# Patient Record
Sex: MALE | Race: WHITE | NOT HISPANIC OR LATINO | URBAN - METROPOLITAN AREA
[De-identification: names, ages, dates, MRNs, and addresses within clinical notes are randomized per-mention and may not be internally consistent; named-entity substitution may affect disease eponyms.]

---

## 2019-04-05 ENCOUNTER — OUTPATIENT (OUTPATIENT)
Dept: OUTPATIENT SERVICES | Facility: HOSPITAL | Age: 69
LOS: 1 days | Discharge: HOME | End: 2019-04-05

## 2019-04-05 DIAGNOSIS — N40.0 BENIGN PROSTATIC HYPERPLASIA WITHOUT LOWER URINARY TRACT SYMPTOMS: ICD-10-CM

## 2019-04-05 DIAGNOSIS — E11.9 TYPE 2 DIABETES MELLITUS WITHOUT COMPLICATIONS: ICD-10-CM

## 2019-04-05 DIAGNOSIS — E53.8 DEFICIENCY OF OTHER SPECIFIED B GROUP VITAMINS: ICD-10-CM

## 2019-04-05 DIAGNOSIS — E78.5 HYPERLIPIDEMIA, UNSPECIFIED: ICD-10-CM

## 2019-04-05 DIAGNOSIS — E55.9 VITAMIN D DEFICIENCY, UNSPECIFIED: ICD-10-CM

## 2019-04-05 DIAGNOSIS — I10 ESSENTIAL (PRIMARY) HYPERTENSION: ICD-10-CM

## 2019-04-05 DIAGNOSIS — D50.8 OTHER IRON DEFICIENCY ANEMIAS: ICD-10-CM

## 2019-04-05 DIAGNOSIS — E03.9 HYPOTHYROIDISM, UNSPECIFIED: ICD-10-CM

## 2019-09-20 ENCOUNTER — APPOINTMENT (OUTPATIENT)
Dept: CARDIOLOGY | Facility: CLINIC | Age: 69
End: 2019-09-20
Payer: MEDICARE

## 2019-09-20 PROCEDURE — 99214 OFFICE O/P EST MOD 30 MIN: CPT | Mod: 25

## 2019-09-20 PROCEDURE — 93000 ELECTROCARDIOGRAM COMPLETE: CPT

## 2019-10-15 ENCOUNTER — APPOINTMENT (OUTPATIENT)
Dept: CARDIOLOGY | Facility: CLINIC | Age: 69
End: 2019-10-15
Payer: MEDICARE

## 2019-10-15 PROCEDURE — 93880 EXTRACRANIAL BILAT STUDY: CPT

## 2020-01-10 ENCOUNTER — APPOINTMENT (OUTPATIENT)
Dept: CARDIOLOGY | Facility: CLINIC | Age: 70
End: 2020-01-10
Payer: MEDICARE

## 2020-01-10 PROCEDURE — 93000 ELECTROCARDIOGRAM COMPLETE: CPT

## 2020-01-10 PROCEDURE — 99214 OFFICE O/P EST MOD 30 MIN: CPT

## 2020-05-19 ENCOUNTER — APPOINTMENT (OUTPATIENT)
Dept: CARDIOLOGY | Facility: CLINIC | Age: 70
End: 2020-05-19
Payer: MEDICARE

## 2020-05-19 PROCEDURE — 99213 OFFICE O/P EST LOW 20 MIN: CPT | Mod: 95

## 2020-07-24 ENCOUNTER — APPOINTMENT (OUTPATIENT)
Dept: CARDIOLOGY | Facility: CLINIC | Age: 70
End: 2020-07-24
Payer: MEDICARE

## 2020-07-24 PROCEDURE — 93000 ELECTROCARDIOGRAM COMPLETE: CPT

## 2020-07-24 PROCEDURE — 99213 OFFICE O/P EST LOW 20 MIN: CPT

## 2020-09-03 ENCOUNTER — RX RENEWAL (OUTPATIENT)
Age: 70
End: 2020-09-03

## 2020-10-04 ENCOUNTER — RX RENEWAL (OUTPATIENT)
Age: 70
End: 2020-10-04

## 2021-02-24 ENCOUNTER — RX RENEWAL (OUTPATIENT)
Age: 71
End: 2021-02-24

## 2021-03-29 ENCOUNTER — RX RENEWAL (OUTPATIENT)
Age: 71
End: 2021-03-29

## 2021-04-09 ENCOUNTER — APPOINTMENT (OUTPATIENT)
Dept: CARDIOLOGY | Facility: CLINIC | Age: 71
End: 2021-04-09
Payer: MEDICARE

## 2021-04-09 VITALS
DIASTOLIC BLOOD PRESSURE: 75 MMHG | HEIGHT: 66 IN | BODY MASS INDEX: 35.36 KG/M2 | HEART RATE: 54 BPM | SYSTOLIC BLOOD PRESSURE: 120 MMHG | WEIGHT: 220 LBS

## 2021-04-09 DIAGNOSIS — Z78.9 OTHER SPECIFIED HEALTH STATUS: ICD-10-CM

## 2021-04-09 DIAGNOSIS — Z87.891 PERSONAL HISTORY OF NICOTINE DEPENDENCE: ICD-10-CM

## 2021-04-09 PROCEDURE — 93000 ELECTROCARDIOGRAM COMPLETE: CPT

## 2021-04-09 PROCEDURE — 99213 OFFICE O/P EST LOW 20 MIN: CPT

## 2021-04-09 PROCEDURE — 99072 ADDL SUPL MATRL&STAF TM PHE: CPT

## 2021-04-09 NOTE — HISTORY OF PRESENT ILLNESS
[FreeTextEntry1] : 70-yo male with h/o CAD, s/p CABG, HTN, hyperlipidemia.\par \par Inferolateral ischemia in 2016. LHC showed patent grafts, 80% stenosis of proximal LCX (at the take-off of bypassed OM1), stented successfully with BRIDGET ([employer]).\par \par S/p gastric sleeve surgery, patient lost weight initially, gained again.\par \par No CP, SOB now. BP has been stable.

## 2021-04-09 NOTE — ASSESSMENT
[FreeTextEntry1] : CAD,  s/p CABG and BRIDGET LCX. No angina.\par HTN.\par Hyperlipidemia.\par Obesity.\par \par Plan:\par Diet, weight loss discussed again.\par Increase daily walking.\par BW from PMD.\par F/u in 6 months.\par \par Nelson Lancaster MD\par

## 2021-04-09 NOTE — PHYSICAL EXAM
[General Appearance - Well Developed] : well developed [Normal Appearance] : normal appearance [Well Groomed] : well groomed [General Appearance - Well Nourished] : well nourished [No Deformities] : no deformities [General Appearance - In No Acute Distress] : no acute distress [Normal Conjunctiva] : the conjunctiva exhibited no abnormalities [Respiration, Rhythm And Depth] : normal respiratory rhythm and effort [Exaggerated Use Of Accessory Muscles For Inspiration] : no accessory muscle use [Auscultation Breath Sounds / Voice Sounds] : lungs were clear to auscultation bilaterally [Heart Rate And Rhythm] : heart rate and rhythm were normal [Heart Sounds] : normal S1 and S2 [Murmurs] : no murmurs present [Arterial Pulses Normal] : the arterial pulses were normal [Edema] : no peripheral edema present [Bowel Sounds] : normal bowel sounds [Abdomen Soft] : soft [Abdomen Tenderness] : non-tender [Skin Color & Pigmentation] : normal skin color and pigmentation [] : no rash [Oriented To Time, Place, And Person] : oriented to person, place, and time

## 2021-08-24 ENCOUNTER — RX RENEWAL (OUTPATIENT)
Age: 71
End: 2021-08-24

## 2021-09-29 ENCOUNTER — RX RENEWAL (OUTPATIENT)
Age: 71
End: 2021-09-29

## 2021-10-21 ENCOUNTER — APPOINTMENT (OUTPATIENT)
Dept: CARDIOLOGY | Facility: CLINIC | Age: 71
End: 2021-10-21
Payer: MEDICARE

## 2021-10-21 VITALS
DIASTOLIC BLOOD PRESSURE: 70 MMHG | TEMPERATURE: 97.8 F | HEIGHT: 66 IN | HEART RATE: 53 BPM | BODY MASS INDEX: 36.32 KG/M2 | SYSTOLIC BLOOD PRESSURE: 120 MMHG | WEIGHT: 226 LBS

## 2021-10-21 PROCEDURE — 99213 OFFICE O/P EST LOW 20 MIN: CPT

## 2021-10-21 PROCEDURE — 93000 ELECTROCARDIOGRAM COMPLETE: CPT

## 2021-10-21 NOTE — HISTORY OF PRESENT ILLNESS
[FreeTextEntry1] : 71-yo male with h/o CAD, s/p CABG, HTN, hyperlipidemia.\par \par Inferolateral ischemia in 2016. LHC showed patent grafts, 80% stenosis of proximal LCX (at the take-off of bypassed OM1), stented successfully with BRIDGET ([employer]).\par \par S/p gastric sleeve surgery, patient lost weight initially, gained again.\par \par No CP, SOB now. BP has been stable.

## 2022-02-15 ENCOUNTER — RX RENEWAL (OUTPATIENT)
Age: 72
End: 2022-02-15

## 2022-03-22 ENCOUNTER — RX RENEWAL (OUTPATIENT)
Age: 72
End: 2022-03-22

## 2022-04-28 ENCOUNTER — RESULT CHARGE (OUTPATIENT)
Age: 72
End: 2022-04-28

## 2022-04-28 ENCOUNTER — APPOINTMENT (OUTPATIENT)
Dept: CARDIOLOGY | Facility: CLINIC | Age: 72
End: 2022-04-28
Payer: MEDICARE

## 2022-04-28 VITALS
DIASTOLIC BLOOD PRESSURE: 76 MMHG | HEART RATE: 61 BPM | BODY MASS INDEX: 34.55 KG/M2 | WEIGHT: 215 LBS | HEIGHT: 66 IN | SYSTOLIC BLOOD PRESSURE: 138 MMHG

## 2022-04-28 PROCEDURE — 93000 ELECTROCARDIOGRAM COMPLETE: CPT

## 2022-04-28 PROCEDURE — 99213 OFFICE O/P EST LOW 20 MIN: CPT

## 2022-04-28 NOTE — HISTORY OF PRESENT ILLNESS
[FreeTextEntry1] : 71-yo male with h/o CAD, s/p CABG, HTN, hyperlipidemia.\par \par Inferolateral ischemia in 2016. LHC showed patent grafts, 80% stenosis of proximal LCX (at the take-off of bypassed OM1), stented successfully with BRIDGET ([employer]).\par \par S/p gastric sleeve surgery. Patient has lost 20 pounds since starting Ozempic.\par \par No CP, SOB now. BP has been stable.\par \par LDL 92.

## 2022-04-28 NOTE — ASSESSMENT
[FreeTextEntry1] : 72 y/o CAD,  s/p CABG and BRIDGET LCX. No angina.\par HTN.\par Hyperlipidemia.\par Obesity.\par \par \par Plan:\par Diet, weight loss discussed again.\par Increase daily walking.\par Increase Atorvastatin to 40 mg daily.\par Repeat fasting blood work.\par F/u in 6 months.\par \par Nelson Lancaster MD\par

## 2022-04-28 NOTE — PHYSICAL EXAM
[Well Developed] : well developed [No Acute Distress] : no acute distress [Normal Conjunctiva] : normal conjunctiva [Normal Venous Pressure] : normal venous pressure [No Carotid Bruit] : no carotid bruit [Normal S1, S2] : normal S1, S2 [No Murmur] : no murmur [No Rub] : no rub [S4] : S4 [Clear Lung Fields] : clear lung fields [Good Air Entry] : good air entry [No Respiratory Distress] : no respiratory distress  [Soft] : abdomen soft [Non Tender] : non-tender [Normal Bowel Sounds] : normal bowel sounds [Normal Gait] : normal gait [No Edema] : no edema [No Cyanosis] : no cyanosis [No Clubbing] : no clubbing [No Varicosities] : no varicosities [No Rash] : no rash [Moves all extremities] : moves all extremities [No Focal Deficits] : no focal deficits [Normal Speech] : normal speech [Alert and Oriented] : alert and oriented [Normal memory] : normal memory [Obese] : obese

## 2022-08-08 ENCOUNTER — RX RENEWAL (OUTPATIENT)
Age: 72
End: 2022-08-08

## 2022-08-27 ENCOUNTER — RX RENEWAL (OUTPATIENT)
Age: 72
End: 2022-08-27

## 2022-10-27 ENCOUNTER — APPOINTMENT (OUTPATIENT)
Dept: CARDIOLOGY | Facility: CLINIC | Age: 72
End: 2022-10-27

## 2022-10-27 VITALS
RESPIRATION RATE: 14 BRPM | BODY MASS INDEX: 34.55 KG/M2 | HEART RATE: 56 BPM | WEIGHT: 215 LBS | TEMPERATURE: 97.4 F | DIASTOLIC BLOOD PRESSURE: 84 MMHG | HEIGHT: 66 IN | SYSTOLIC BLOOD PRESSURE: 124 MMHG

## 2022-10-27 PROCEDURE — 99213 OFFICE O/P EST LOW 20 MIN: CPT

## 2022-10-27 PROCEDURE — 93000 ELECTROCARDIOGRAM COMPLETE: CPT

## 2022-10-27 RX ORDER — CYANOCOBALAMIN 1000 UG/ML
1000 INJECTION INTRAMUSCULAR; SUBCUTANEOUS
Qty: 3 | Refills: 0 | Status: ACTIVE | COMMUNITY
Start: 2022-07-14

## 2022-10-27 RX ORDER — MOMETASONE FUROATE 1 MG/G
0.1 OINTMENT TOPICAL
Qty: 45 | Refills: 0 | Status: DISCONTINUED | COMMUNITY
Start: 2022-09-03

## 2022-10-27 NOTE — ASSESSMENT
[FreeTextEntry1] : 73 y/o CAD,  s/p CABG and BRIDGET LCX. No angina.\par HTN.\par Hyperlipidemia.\par Obesity.\par \par \par Plan:\par Diet, weight loss discussed again.\par Increase daily walking.\par Continue treatment.\par Repeat fasting blood work.\par F/u in 6 months.\par \par Nelson Lancaster MD\par

## 2022-10-27 NOTE — HISTORY OF PRESENT ILLNESS
[FreeTextEntry1] : 72-yo male with h/o CAD, s/p CABG, HTN, hyperlipidemia.\par \par Inferolateral ischemia in 2016. LHC showed patent grafts, 80% stenosis of proximal LCX (at the take-off of bypassed OM1), stented successfully with BRIDGET ([employer]).\par \par S/p gastric sleeve surgery. Patient has lost 20 pounds since starting Ozempic.\par \par No CP, SOB now. BP has been stable.\par \par LDL 80.

## 2022-10-30 ENCOUNTER — RX RENEWAL (OUTPATIENT)
Age: 72
End: 2022-10-30

## 2023-02-03 ENCOUNTER — RX RENEWAL (OUTPATIENT)
Age: 73
End: 2023-02-03

## 2023-03-06 ENCOUNTER — RX RENEWAL (OUTPATIENT)
Age: 73
End: 2023-03-06

## 2023-04-26 ENCOUNTER — RX RENEWAL (OUTPATIENT)
Age: 73
End: 2023-04-26

## 2023-04-27 ENCOUNTER — APPOINTMENT (OUTPATIENT)
Dept: CARDIOLOGY | Facility: CLINIC | Age: 73
End: 2023-04-27
Payer: MEDICARE

## 2023-04-27 ENCOUNTER — RESULT CHARGE (OUTPATIENT)
Age: 73
End: 2023-04-27

## 2023-04-27 VITALS
WEIGHT: 210 LBS | SYSTOLIC BLOOD PRESSURE: 124 MMHG | HEART RATE: 59 BPM | BODY MASS INDEX: 33.75 KG/M2 | DIASTOLIC BLOOD PRESSURE: 80 MMHG | HEIGHT: 66 IN

## 2023-04-27 DIAGNOSIS — R07.89 OTHER CHEST PAIN: ICD-10-CM

## 2023-04-27 DIAGNOSIS — Z00.00 ENCOUNTER FOR GENERAL ADULT MEDICAL EXAMINATION W/OUT ABNORMAL FINDINGS: ICD-10-CM

## 2023-04-27 DIAGNOSIS — Z95.1 PRESENCE OF AORTOCORONARY BYPASS GRAFT: ICD-10-CM

## 2023-04-27 PROCEDURE — 93000 ELECTROCARDIOGRAM COMPLETE: CPT

## 2023-04-27 PROCEDURE — 99214 OFFICE O/P EST MOD 30 MIN: CPT

## 2023-04-27 NOTE — ASSESSMENT
[FreeTextEntry1] : 71 y/o CAD,  s/p CABG and BRIDGET LCX.\par HTN controlled \par Hyperlipidemia.\par Obesity.\par Chest discomfort\par OCHOA\par Dizziness \par \par Plan:\par Diet, weight loss discussed again.\par Continue treatment.\par Repeat fasting blood work.\par 2D ECHO.\par Exercise MPI.\par Carotid duplex.\par F/u after the tests.\par \par Nelson Lancaster MD\par

## 2023-04-27 NOTE — HISTORY OF PRESENT ILLNESS
[FreeTextEntry1] : 72-yo male with h/o CAD, s/p CABG, HTN, hyperlipidemia.\par \par Inferolateral ischemia in 2016. LHC showed patent grafts, 80% stenosis of proximal LCX (at the take-off of bypassed OM1), stented successfully with BRIDGET ([employer]).\par \par S/p gastric sleeve surgery. Patient has lost 20 pounds since starting Ozempic.\par \par Pt presents for a follow up visit. He is c/o episodes of dizziness after he eats, no syncope. He is also reporting new onset of OCHOA when he walks, used to be able to walk his dog with no symptoms, now he has to stop and rest/sit in the middle of his walk. He is also c/o occasional episodes of left-sided squeezing chest discomfort, with or without exertion. \par \par BP at home normal. \par \par GFR 95.\par \par

## 2023-04-27 NOTE — REVIEW OF SYSTEMS
[Negative] : Neurological [Dyspnea on exertion] : dyspnea during exertion [Chest Discomfort] : chest discomfort [Rash] : no rash [Anxiety] : no anxiety [Easy Bleeding] : no tendency for easy bleeding [Easy Bruising] : no tendency for easy bruising

## 2023-04-28 LAB
ALBUMIN SERPL ELPH-MCNC: 4.8 G/DL
ALP BLD-CCNC: 95 U/L
ALT SERPL-CCNC: 32 U/L
ANION GAP SERPL CALC-SCNC: 16 MMOL/L
AST SERPL-CCNC: 19 U/L
BASOPHILS # BLD AUTO: 0.04 K/UL
BASOPHILS NFR BLD AUTO: 0.5 %
BILIRUB SERPL-MCNC: 0.8 MG/DL
BUN SERPL-MCNC: 22 MG/DL
CALCIUM SERPL-MCNC: 10.1 MG/DL
CHLORIDE SERPL-SCNC: 98 MMOL/L
CHOLEST SERPL-MCNC: 163 MG/DL
CO2 SERPL-SCNC: 25 MMOL/L
CREAT SERPL-MCNC: 0.9 MG/DL
EGFR: 92 ML/MIN/1.73M2
EOSINOPHIL # BLD AUTO: 0.22 K/UL
EOSINOPHIL NFR BLD AUTO: 2.6 %
ESTIMATED AVERAGE GLUCOSE: 108 MG/DL
GLUCOSE SERPL-MCNC: 80 MG/DL
HBA1C MFR BLD HPLC: 5.4 %
HCT VFR BLD CALC: 47.6 %
HDLC SERPL-MCNC: 51 MG/DL
HGB BLD-MCNC: 15.1 G/DL
IMM GRANULOCYTES NFR BLD AUTO: 0.4 %
LDLC SERPL CALC-MCNC: 90 MG/DL
LYMPHOCYTES # BLD AUTO: 2.46 K/UL
LYMPHOCYTES NFR BLD AUTO: 28.7 %
MAN DIFF?: NORMAL
MCHC RBC-ENTMCNC: 29.7 PG
MCHC RBC-ENTMCNC: 31.7 G/DL
MCV RBC AUTO: 93.7 FL
MONOCYTES # BLD AUTO: 0.93 K/UL
MONOCYTES NFR BLD AUTO: 10.9 %
NEUTROPHILS # BLD AUTO: 4.89 K/UL
NEUTROPHILS NFR BLD AUTO: 56.9 %
NONHDLC SERPL-MCNC: 112 MG/DL
PLATELET # BLD AUTO: 260 K/UL
POTASSIUM SERPL-SCNC: 4.5 MMOL/L
PROT SERPL-MCNC: 7.6 G/DL
RBC # BLD: 5.08 M/UL
RBC # FLD: 14.4 %
SODIUM SERPL-SCNC: 139 MMOL/L
TRIGL SERPL-MCNC: 109 MG/DL
TSH SERPL-ACNC: 2.49 UIU/ML
WBC # FLD AUTO: 8.57 K/UL

## 2023-05-11 ENCOUNTER — APPOINTMENT (OUTPATIENT)
Dept: CARDIOLOGY | Facility: CLINIC | Age: 73
End: 2023-05-11
Payer: MEDICARE

## 2023-05-11 PROCEDURE — 93306 TTE W/DOPPLER COMPLETE: CPT

## 2023-05-16 ENCOUNTER — OUTPATIENT (OUTPATIENT)
Dept: OUTPATIENT SERVICES | Facility: HOSPITAL | Age: 73
LOS: 1 days | End: 2023-05-16
Payer: MEDICARE

## 2023-05-16 ENCOUNTER — RESULT REVIEW (OUTPATIENT)
Age: 73
End: 2023-05-16

## 2023-05-16 DIAGNOSIS — R07.89 OTHER CHEST PAIN: ICD-10-CM

## 2023-05-16 DIAGNOSIS — Z00.8 ENCOUNTER FOR OTHER GENERAL EXAMINATION: ICD-10-CM

## 2023-05-16 DIAGNOSIS — R06.02 SHORTNESS OF BREATH: ICD-10-CM

## 2023-05-16 PROCEDURE — 78452 HT MUSCLE IMAGE SPECT MULT: CPT

## 2023-05-16 PROCEDURE — 93018 CV STRESS TEST I&R ONLY: CPT

## 2023-05-16 PROCEDURE — A9500: CPT

## 2023-05-16 PROCEDURE — 78452 HT MUSCLE IMAGE SPECT MULT: CPT | Mod: 26

## 2023-05-17 DIAGNOSIS — R06.02 SHORTNESS OF BREATH: ICD-10-CM

## 2023-05-17 DIAGNOSIS — R07.89 OTHER CHEST PAIN: ICD-10-CM

## 2023-06-02 ENCOUNTER — APPOINTMENT (OUTPATIENT)
Dept: CARDIOLOGY | Facility: CLINIC | Age: 73
End: 2023-06-02
Payer: MEDICARE

## 2023-06-02 PROCEDURE — 93880 EXTRACRANIAL BILAT STUDY: CPT

## 2023-06-22 ENCOUNTER — APPOINTMENT (OUTPATIENT)
Dept: CARDIOLOGY | Facility: CLINIC | Age: 73
End: 2023-06-22
Payer: MEDICARE

## 2023-06-22 ENCOUNTER — RESULT CHARGE (OUTPATIENT)
Age: 73
End: 2023-06-22

## 2023-06-22 VITALS
WEIGHT: 207 LBS | BODY MASS INDEX: 33.27 KG/M2 | SYSTOLIC BLOOD PRESSURE: 118 MMHG | DIASTOLIC BLOOD PRESSURE: 85 MMHG | HEIGHT: 66 IN | HEART RATE: 62 BPM

## 2023-06-22 DIAGNOSIS — R06.02 SHORTNESS OF BREATH: ICD-10-CM

## 2023-06-22 DIAGNOSIS — I25.9 CHRONIC ISCHEMIC HEART DISEASE, UNSPECIFIED: ICD-10-CM

## 2023-06-22 PROCEDURE — 93000 ELECTROCARDIOGRAM COMPLETE: CPT

## 2023-06-22 PROCEDURE — 99214 OFFICE O/P EST MOD 30 MIN: CPT

## 2023-06-22 NOTE — REVIEW OF SYSTEMS
[Dyspnea on exertion] : dyspnea during exertion [Chest Discomfort] : chest discomfort [Negative] : Neurological [Rash] : no rash [Anxiety] : no anxiety [Easy Bleeding] : no tendency for easy bleeding [Easy Bruising] : no tendency for easy bruising

## 2023-06-22 NOTE — ASSESSMENT
[FreeTextEntry1] : 74 y/o CAD,  s/p CABG and BRIDGET LCX.\par HTN controlled \par Hyperlipidemia not well controlled \par Obesity.\par Episodes of chest pain and increased OCHOA. New inferior wall motion abnormalities and RV dysfunction, evidence of inferior wall ischemia.\par \par Plan:\par Diet, weight loss discussed again.\par Increase Atorvastatin to 80 mg daily.\par Will schedule LHC at [facility].\par F/u after the cath.\par \par Nelson Lancaster MD\par

## 2023-06-23 LAB
ANION GAP SERPL CALC-SCNC: 13 MMOL/L
BUN SERPL-MCNC: 29 MG/DL
CALCIUM SERPL-MCNC: 9.3 MG/DL
CHLORIDE SERPL-SCNC: 103 MMOL/L
CO2 SERPL-SCNC: 26 MMOL/L
CREAT SERPL-MCNC: 0.9 MG/DL
EGFR: 91 ML/MIN/1.73M2
GLUCOSE SERPL-MCNC: 103 MG/DL
INR PPP: 1 RATIO
POTASSIUM SERPL-SCNC: 3.6 MMOL/L
PT BLD: 11.4 SEC
SODIUM SERPL-SCNC: 142 MMOL/L

## 2023-07-05 ENCOUNTER — OUTPATIENT (OUTPATIENT)
Dept: INPATIENT UNIT | Facility: HOSPITAL | Age: 73
LOS: 1 days | Discharge: HOME | End: 2023-07-05
Payer: MEDICARE

## 2023-07-05 VITALS
SYSTOLIC BLOOD PRESSURE: 155 MMHG | RESPIRATION RATE: 18 BRPM | DIASTOLIC BLOOD PRESSURE: 68 MMHG | OXYGEN SATURATION: 99 % | HEART RATE: 61 BPM

## 2023-07-05 DIAGNOSIS — I25.119 ATHEROSCLEROTIC HEART DISEASE OF NATIVE CORONARY ARTERY WITH UNSPECIFIED ANGINA PECTORIS: ICD-10-CM

## 2023-07-05 DIAGNOSIS — Z95.1 PRESENCE OF AORTOCORONARY BYPASS GRAFT: Chronic | ICD-10-CM

## 2023-07-05 DIAGNOSIS — R07.9 CHEST PAIN, UNSPECIFIED: ICD-10-CM

## 2023-07-05 DIAGNOSIS — Z90.3 ACQUIRED ABSENCE OF STOMACH [PART OF]: Chronic | ICD-10-CM

## 2023-07-05 LAB
ANION GAP SERPL CALC-SCNC: 11 MMOL/L — SIGNIFICANT CHANGE UP (ref 7–14)
BUN SERPL-MCNC: 23 MG/DL — HIGH (ref 10–20)
CALCIUM SERPL-MCNC: 9.3 MG/DL — SIGNIFICANT CHANGE UP (ref 8.4–10.4)
CHLORIDE SERPL-SCNC: 102 MMOL/L — SIGNIFICANT CHANGE UP (ref 98–110)
CO2 SERPL-SCNC: 28 MMOL/L — SIGNIFICANT CHANGE UP (ref 17–32)
CREAT SERPL-MCNC: 1 MG/DL — SIGNIFICANT CHANGE UP (ref 0.7–1.5)
EGFR: 80 ML/MIN/1.73M2 — SIGNIFICANT CHANGE UP
GLUCOSE SERPL-MCNC: 96 MG/DL — SIGNIFICANT CHANGE UP (ref 70–99)
HCT VFR BLD CALC: 40.6 % — LOW (ref 42–52)
HCT VFR BLD CALC: 42.8 % — SIGNIFICANT CHANGE UP (ref 42–52)
HGB BLD-MCNC: 13.9 G/DL — LOW (ref 14–18)
HGB BLD-MCNC: 14.1 G/DL — SIGNIFICANT CHANGE UP (ref 14–18)
MCHC RBC-ENTMCNC: 29.9 PG — SIGNIFICANT CHANGE UP (ref 27–31)
MCHC RBC-ENTMCNC: 30.4 PG — SIGNIFICANT CHANGE UP (ref 27–31)
MCHC RBC-ENTMCNC: 32.9 G/DL — SIGNIFICANT CHANGE UP (ref 32–37)
MCHC RBC-ENTMCNC: 34.2 G/DL — SIGNIFICANT CHANGE UP (ref 32–37)
MCV RBC AUTO: 88.8 FL — SIGNIFICANT CHANGE UP (ref 80–94)
MCV RBC AUTO: 90.9 FL — SIGNIFICANT CHANGE UP (ref 80–94)
NRBC # BLD: 0 /100 WBCS — SIGNIFICANT CHANGE UP (ref 0–0)
NRBC # BLD: 0 /100 WBCS — SIGNIFICANT CHANGE UP (ref 0–0)
PLATELET # BLD AUTO: 217 K/UL — SIGNIFICANT CHANGE UP (ref 130–400)
PLATELET # BLD AUTO: 241 K/UL — SIGNIFICANT CHANGE UP (ref 130–400)
PMV BLD: 10.1 FL — SIGNIFICANT CHANGE UP (ref 7.4–10.4)
PMV BLD: 10.2 FL — SIGNIFICANT CHANGE UP (ref 7.4–10.4)
POTASSIUM SERPL-MCNC: 4 MMOL/L — SIGNIFICANT CHANGE UP (ref 3.5–5)
POTASSIUM SERPL-SCNC: 4 MMOL/L — SIGNIFICANT CHANGE UP (ref 3.5–5)
RBC # BLD: 4.57 M/UL — LOW (ref 4.7–6.1)
RBC # BLD: 4.71 M/UL — SIGNIFICANT CHANGE UP (ref 4.7–6.1)
RBC # FLD: 14 % — SIGNIFICANT CHANGE UP (ref 11.5–14.5)
RBC # FLD: 14.3 % — SIGNIFICANT CHANGE UP (ref 11.5–14.5)
SODIUM SERPL-SCNC: 141 MMOL/L — SIGNIFICANT CHANGE UP (ref 135–146)
WBC # BLD: 7.95 K/UL — SIGNIFICANT CHANGE UP (ref 4.8–10.8)
WBC # BLD: 9.56 K/UL — SIGNIFICANT CHANGE UP (ref 4.8–10.8)
WBC # FLD AUTO: 7.95 K/UL — SIGNIFICANT CHANGE UP (ref 4.8–10.8)
WBC # FLD AUTO: 9.56 K/UL — SIGNIFICANT CHANGE UP (ref 4.8–10.8)

## 2023-07-05 PROCEDURE — 80048 BASIC METABOLIC PNL TOTAL CA: CPT

## 2023-07-05 PROCEDURE — C1725: CPT

## 2023-07-05 PROCEDURE — C1874: CPT

## 2023-07-05 PROCEDURE — 93455 CORONARY ART/GRFT ANGIO S&I: CPT | Mod: 59

## 2023-07-05 PROCEDURE — C1894: CPT

## 2023-07-05 PROCEDURE — 92928 PRQ TCAT PLMT NTRAC ST 1 LES: CPT | Mod: LC

## 2023-07-05 PROCEDURE — 93005 ELECTROCARDIOGRAM TRACING: CPT

## 2023-07-05 PROCEDURE — 36415 COLL VENOUS BLD VENIPUNCTURE: CPT

## 2023-07-05 PROCEDURE — 93458 L HRT ARTERY/VENTRICLE ANGIO: CPT | Mod: 26

## 2023-07-05 PROCEDURE — C1769: CPT

## 2023-07-05 PROCEDURE — C1887: CPT

## 2023-07-05 PROCEDURE — 93010 ELECTROCARDIOGRAM REPORT: CPT

## 2023-07-05 PROCEDURE — 85027 COMPLETE CBC AUTOMATED: CPT

## 2023-07-05 RX ORDER — ATORVASTATIN CALCIUM 80 MG/1
1 TABLET, FILM COATED ORAL
Refills: 0 | DISCHARGE

## 2023-07-05 RX ORDER — METOPROLOL TARTRATE 50 MG
1 TABLET ORAL
Refills: 0 | DISCHARGE

## 2023-07-05 RX ORDER — ASPIRIN/CALCIUM CARB/MAGNESIUM 324 MG
1 TABLET ORAL
Qty: 30 | Refills: 3
Start: 2023-07-05 | End: 2023-11-01

## 2023-07-05 RX ORDER — AMLODIPINE BESYLATE 2.5 MG/1
1 TABLET ORAL
Refills: 0 | DISCHARGE

## 2023-07-05 RX ORDER — ASPIRIN/CALCIUM CARB/MAGNESIUM 324 MG
1 TABLET ORAL
Refills: 0 | DISCHARGE

## 2023-07-05 RX ORDER — PANTOPRAZOLE SODIUM 20 MG/1
1 TABLET, DELAYED RELEASE ORAL
Qty: 30 | Refills: 0
Start: 2023-07-05 | End: 2023-08-03

## 2023-07-05 RX ORDER — TELMISARTAN AND HYDROCHLOROTHIAZIDE 40; 12.5 MG/1; MG/1
1 TABLET ORAL
Refills: 0 | DISCHARGE

## 2023-07-05 RX ORDER — TICAGRELOR 90 MG/1
1 TABLET ORAL
Qty: 60 | Refills: 3
Start: 2023-07-05 | End: 2023-11-01

## 2023-07-05 NOTE — ASU PATIENT PROFILE, ADULT - FALL HARM RISK - UNIVERSAL INTERVENTIONS
Bed in lowest position, wheels locked, appropriate side rails in place/Call bell, personal items and telephone in reach/Instruct patient to call for assistance before getting out of bed or chair/Non-slip footwear when patient is out of bed/[place] to call system/Physically safe environment - no spills, clutter or unnecessary equipment/Purposeful Proactive Rounding/Room/bathroom lighting operational, light cord in reach

## 2023-07-05 NOTE — H&P CARDIOLOGY - NSICDXPASTMEDICALHX_GEN_ALL_CORE_FT
PAST MEDICAL HISTORY:  CAD (coronary artery disease)     CAD S/P percutaneous coronary angioplasty     HTN (hypertension)     Hyperlipidemia

## 2023-07-05 NOTE — PROGRESS NOTE ADULT - SUBJECTIVE AND OBJECTIVE BOX
Cardiology Follow up s/p PCI BRIDGET to mCx x 1    JOSH HUGHES   73y Male    PAST MEDICAL & SURGICAL HISTORY:  CAD (coronary artery disease)  Hyperlipidemia  HTN (hypertension)  CAD S/P percutaneous coronary angioplasty  S/P CABG (coronary artery bypass graft)  H/O gastric sleeve    HPI:  Patient is a 73y Male who has a PMH of CAD s/p CABG in [facility] over 10years ago per patient (LIMA-LAD,SVG to OM1, SVG to RPDA), s/p PCI 8/10/2016 to LCx, HTN, HLD,  Sx history: CABG, gastric sleeve  Family history: none    Patient has been having symptoms of CP and OCHOA with fatigue after walking his dog and gets better with rest. Patient had + Stress test 23 and presents to the cardiology department for LHC with possible intervention.     < from: NM Nuclear Stress Pharmacologic Multiple (23 @ 15:08) >    Impression:    1.   There is a small sized perfusion defect involving the basal inferior   and basal inferolateral walls of the left ventricle. On the resting   images, this defect is reversible suggesting ischemia.  2.   Stress EKG was nondiagnostic due to baseline ST and T-wave changes.  3.  Left ventricular ejection fraction of  > 55  %.  4.  This is a low  risk positive result.        Pre cath note:  indication:  [ ] STEMI                [ ] NSTEMI                 [ ] Acute coronary syndrome                   [ ]Unstable Angina   [ ] high risk  [ ] intermediate risk  [ ] low risk                   [x ] Stable Angina     non-invasive testing:    stress test                      Date:   23                  result: [ ] high risk  [ x] intermediate risk  [ ] low risk    Anti- Anginal medications:                    [ ] not used                       [x ] used                   [ ] not used but strong indication not to use  -amlodipine 10mg   -Metoprolol Succinate 25mg    Ejection Fraction                   [ ] <29            [ ] 30-39%   [ ] 40-49%     [x ]>50%    CHF                   [ ] active (within last 14 days on meds   [ ] Chronic (on meds but no exacerbation)    COPD                   [ ] mild (on chronic bronchodilators)  [ ] moderate (on chronic steroid therapy)      [ ] severe (indication for home O2 or PACO2 >50)    Other risk factors:                     [ ] Previous MI                     [ ] CVA/ stroke                    [ ] carotid stent/ CEA                    [ ] PVD/PAD- (arterial aneurysm, non-palpable pulses, tortuous vessel with inability to insert catheter, infra-renal dissection, renal or subclavian artery stenosis)                    [ ] diabetic                    [x ] previous CABG                    [ ] Renal Failure     Bleeding Risk: 0.7%      Left RADIAL ARTERY EVALUATION:  CONRAD TEST: [] Negative          [] Positive  BARBEAU TEST: [] Class A           [] Class B           [] Class C            [x] Class D        EF: 57%    EK23 SR     Fluids: 250cc NS bolus x 1 for pre cath     Patient did not take ASA since 7/3  -ASA 81mg x 2 PO now for pre cath  (2023 07:13)    Allergies: No Known Allergies    Intolerances      Patient seen and examined at bedside.  Patient without complaints.  Denies CP, SOB, palpitations, or dizziness  No events on telemetry     Vital Signs Last 24 Hrs  T(C): --  T(F): --  HR: 54)  BP: 158/81  BP(mean):   RR: 18  SpO2: 99% room air      MEDICATIONS  (STANDING):    MEDICATIONS  (PRN):      REVIEW OF SYSTEMS:          All negative except as mentioned in HPI    PHYSICAL EXAM:           CONSTITUTIONAL: Well-developed; well-nourished; in no acute distress  	SKIN: warm, dry  	HEAD: Normocephalic; atraumatic  	EYES: PERRL.  	ENT: No nasal discharge, airway clear, mucous membranes moist  	NECK: Supple; non tender.  	CARD: +S1, +S2, no murmurs, gallops, or rubs. Regular rate and rhythm    	RESP: No wheezes, rales or rhonchi. CTA B/L  	ABD: soft ntnd, + BS x 4 quadrants  	EXT: moves all extremities,  no clubbing, cyanosis or edema  	NEURO: Alert and oriented x3, no focal deficits          PSYCH: Cooperative, appropriate          VASCULAR:  + Rad / + PTs / +  DPs          EXTREMITY:             Right Groin:  Dressing D/C/I, Sheath currently in place, access site soft, no hematoma, no pain, + pulses, no sign of infection, no numbness  	   Leftt Radial: Dressing D/C/I, TR band in place, access site soft, no hematoma, no pain, + pulses, no sign of infection, no numbness            ECG: pending at 1600  labs pending at 1600                                                                                                                  LABS:                        14.1   7.95  )-----------( 241      ( 2023 07:29 )             42.8     07-05    141  |  102  |  23<H>  ----------------------------<  96  4.0   |  28  |  1.0    Ca    9.3      2023 07:29      A/P:  I discussed the case with Cardiologist Dr. Lancaster and recommend the following:    S/P PCI:     Intervention:  SYNERGY XD 2.75X32 distal circumflex (AUC 7)     FINDINGS:   The coronary circulation is co-dominant.    Left main: The vessel was large sized. Distal left main: There was a 40 % stenosis.     LAD: The vessel was medium sized.   Proximal LAD: Mild atherosclerosis with no flow limiting lesions.   Mid LAD: Competitive flow noted. Fills from patient LIMA-LAD graft   Distal LAD; The artery was supplied by a patent bypass graft.   1st diagonal: Moderate atherosclerosis.    Circumflex: Medium sized (co-dominant)  Proximal circumflex: Mild disease.   Distal Circumflex: 80% lesion. Collaterals to RCA.  Ist obtuse marginal: Supplied by a patent bypass graft    RCA: Medium to large sized.   Proximal RCA: 60% atherosclerosis at the distal end.   Mid RCA: Moderate atherosclerotic disease  Distal RCA: Supplied by collaterals from left system  RPDA: Small sized vessel     Graft to the distal LAD: Patent    Graft to the 1st obtuse marginal: Patent    Graft to the RPDA: 100% occluded graft                        CBC/ECG at 1600                   NS  100cc/hr x _6hr  	     Continue DAPT ( Aspirin 81 mg PO Daily and Brilinta 90mg twice daily ),  Telmisartan/HCTZ, B-Blocker, Statin Therapy, Norvasc, PPI                   Patient pharmacy called in for Brilinta prescription and it is  $37.00 per month, patient agreeable with monthly price, available for pickup today                  Patient to take pm dose Brilinta 90mg before discharge tonight from cath lab                    Patient agreeing to take DAPT for at least one year or as directed by cardiologist                    Pt given instructions on importance of taking antiplatelet medication or risk acute stent thrombosis/death                   Post cath instructions, access site care and activity restrictions reviewed with patient                     Discussed with patient to return to hospital if experience chest pain, shortness breath, dizziness and site bleeding                   Aggressive risk factor modification, diet counseling, smoking cessation discussed with patient                       Can discharge patient from cardiac standpoint at 2000 after ambulating without symptoms and access site wnl, ECG and blood work reviewed                    Benefits of Cardiac Rehab discussed with patient, All [facility]. Patient instructed to call and make first                               appointment after first f/u visit with Cardiologist                    Follow up with Cardiology Dr. Lancaster  in  two weeks.  Instructed to call and make an appointment                      Discharge instructions as follows, when ready to d/c:                   - Continue medical regimen as prescribed to prevent chest pain                   - Continue dual anti-platelet therapy, beta blocker, statin, PPI, telmisartan/HCTZ, norvasc                   - If you are diabetic and taking medication containing Metformin, do not take them for 48 hours after the procedure                   - Instructed to call 911 if chest pain, shortness of breath or bleeding from access site                   - No heavy lifting >10lbs x 1 week                   - No driving x 24 hours                   - No baths, swimming pools x 1 week, may shower                   - Low sodium low fat low cholesterol diet                   - Follow-up with Cardiologist in 2 weeks after discharge                                        Cardiology Follow up s/p PCI BRIDGET to dCx x 1    JOSH HUGHES   73y Male    PAST MEDICAL & SURGICAL HISTORY:  CAD (coronary artery disease)  Hyperlipidemia  HTN (hypertension)  CAD S/P percutaneous coronary angioplasty  S/P CABG (coronary artery bypass graft)  H/O gastric sleeve    HPI:  Patient is a 73y Male who has a PMH of CAD s/p CABG in [facility] over 10years ago per patient (LIMA-LAD,SVG to OM1, SVG to RPDA), s/p PCI 8/10/2016 to LCx, HTN, HLD,  Sx history: CABG, gastric sleeve  Family history: none    Patient has been having symptoms of CP and OCHOA with fatigue after walking his dog and gets better with rest. Patient had + Stress test 23 and presents to the cardiology department for LHC with possible intervention.     < from: NM Nuclear Stress Pharmacologic Multiple (23 @ 15:08) >    Impression:    1.   There is a small sized perfusion defect involving the basal inferior   and basal inferolateral walls of the left ventricle. On the resting   images, this defect is reversible suggesting ischemia.  2.   Stress EKG was nondiagnostic due to baseline ST and T-wave changes.  3.  Left ventricular ejection fraction of  > 55  %.  4.  This is a low  risk positive result.        Pre cath note:  indication:  [ ] STEMI                [ ] NSTEMI                 [ ] Acute coronary syndrome                   [ ]Unstable Angina   [ ] high risk  [ ] intermediate risk  [ ] low risk                   [x ] Stable Angina     non-invasive testing:    stress test                      Date:   23                  result: [ ] high risk  [ x] intermediate risk  [ ] low risk    Anti- Anginal medications:                    [ ] not used                       [x ] used                   [ ] not used but strong indication not to use  -amlodipine 10mg   -Metoprolol Succinate 25mg    Ejection Fraction                   [ ] <29            [ ] 30-39%   [ ] 40-49%     [x ]>50%    CHF                   [ ] active (within last 14 days on meds   [ ] Chronic (on meds but no exacerbation)    COPD                   [ ] mild (on chronic bronchodilators)  [ ] moderate (on chronic steroid therapy)      [ ] severe (indication for home O2 or PACO2 >50)    Other risk factors:                     [ ] Previous MI                     [ ] CVA/ stroke                    [ ] carotid stent/ CEA                    [ ] PVD/PAD- (arterial aneurysm, non-palpable pulses, tortuous vessel with inability to insert catheter, infra-renal dissection, renal or subclavian artery stenosis)                    [ ] diabetic                    [x ] previous CABG                    [ ] Renal Failure     Bleeding Risk: 0.7%      Left RADIAL ARTERY EVALUATION:  CONRAD TEST: [] Negative          [] Positive  BARBEAU TEST: [] Class A           [] Class B           [] Class C            [x] Class D        EF: 57%    EK23 SR     Fluids: 250cc NS bolus x 1 for pre cath     Patient did not take ASA since 7/3  -ASA 81mg x 2 PO now for pre cath  (2023 07:13)    Allergies: No Known Allergies    Intolerances      Patient seen and examined at bedside.  Patient without complaints.  Denies CP, SOB, palpitations, or dizziness  No events on telemetry     Vital Signs Last 24 Hrs  T(C): --  T(F): --  HR: 54)  BP: 158/81  BP(mean):   RR: 18  SpO2: 99% room air      MEDICATIONS  (STANDING):    MEDICATIONS  (PRN):      REVIEW OF SYSTEMS:          All negative except as mentioned in HPI    PHYSICAL EXAM:           CONSTITUTIONAL: Well-developed; well-nourished; in no acute distress  	SKIN: warm, dry  	HEAD: Normocephalic; atraumatic  	EYES: PERRL.  	ENT: No nasal discharge, airway clear, mucous membranes moist  	NECK: Supple; non tender.  	CARD: +S1, +S2, no murmurs, gallops, or rubs. Regular rate and rhythm    	RESP: No wheezes, rales or rhonchi. CTA B/L  	ABD: soft ntnd, + BS x 4 quadrants  	EXT: moves all extremities,  no clubbing, cyanosis or edema  	NEURO: Alert and oriented x3, no focal deficits          PSYCH: Cooperative, appropriate          VASCULAR:  + Rad / + PTs / +  DPs          EXTREMITY:             Right Groin:  Dressing D/C/I, Sheath currently in place, access site soft, no hematoma, no pain, + pulses, no sign of infection, no numbness  	   Leftt Radial: Dressing D/C/I, TR band in place, access site soft, no hematoma, no pain, + pulses, no sign of infection, no numbness            ECG: pending at 1600  labs pending at 1600                                                                                                                  LABS:                        14.1   7.95  )-----------( 241      ( 2023 07:29 )             42.8     07-05    141  |  102  |  23<H>  ----------------------------<  96  4.0   |  28  |  1.0    Ca    9.3      2023 07:29      A/P:  I discussed the case with Cardiologist Dr. Lancaster and recommend the following:    S/P PCI:     Intervention:  SYNERGY XD 2.75X32 distal circumflex (AUC 7)     FINDINGS:   The coronary circulation is co-dominant.    Left main: The vessel was large sized. Distal left main: There was a 40 % stenosis.     LAD: The vessel was medium sized.   Proximal LAD: Mild atherosclerosis with no flow limiting lesions.   Mid LAD: Competitive flow noted. Fills from patient LIMA-LAD graft   Distal LAD; The artery was supplied by a patent bypass graft.   1st diagonal: Moderate atherosclerosis.    Circumflex: Medium sized (co-dominant)  Proximal circumflex: Mild disease.   Distal Circumflex: 80% lesion. Collaterals to RCA.  Ist obtuse marginal: Supplied by a patent bypass graft    RCA: Medium to large sized.   Proximal RCA: 60% atherosclerosis at the distal end.   Mid RCA: Moderate atherosclerotic disease  Distal RCA: Supplied by collaterals from left system  RPDA: Small sized vessel     Graft to the distal LAD: Patent    Graft to the 1st obtuse marginal: Patent    Graft to the RPDA: 100% occluded graft                        CBC/ECG at 1600                   NS  100cc/hr x _6hr  	     Continue DAPT ( Aspirin 81 mg PO Daily and Brilinta 90mg twice daily ),  Telmisartan/HCTZ, B-Blocker, Statin Therapy, Norvasc, PPI                   Patient pharmacy called in for Brilinta prescription and it is  $37.00 per month, patient agreeable with monthly price, available for pickup today                  Patient to take pm dose Brilinta 90mg before discharge tonight from cath lab                    Patient agreeing to take DAPT for at least one year or as directed by cardiologist                    Pt given instructions on importance of taking antiplatelet medication or risk acute stent thrombosis/death                   Post cath instructions, access site care and activity restrictions reviewed with patient                     Discussed with patient to return to hospital if experience chest pain, shortness breath, dizziness and site bleeding                   Aggressive risk factor modification, diet counseling, smoking cessation discussed with patient                       Can discharge patient from cardiac standpoint at 2000 after ambulating without symptoms and access site wnl, ECG and blood work reviewed                    Benefits of Cardiac Rehab discussed with patient, All documents sent to [facility]. Patient instructed to call and make first                               appointment after first f/u visit with Cardiologist                    Follow up with Cardiology Dr. Lancaster  in  two weeks.  Instructed to call and make an appointment                      Discharge instructions as follows, when ready to d/c:                   - Continue medical regimen as prescribed to prevent chest pain                   - Continue dual anti-platelet therapy, beta blocker, statin, PPI, telmisartan/HCTZ, norvasc                   - If you are diabetic and taking medication containing Metformin, do not take them for 48 hours after the procedure                   - Instructed to call 911 if chest pain, shortness of breath or bleeding from access site                   - No heavy lifting >10lbs x 1 week                   - No driving x 24 hours                   - No baths, swimming pools x 1 week, may shower                   - Low sodium low fat low cholesterol diet                   - Follow-up with Cardiologist in 2 weeks after discharge

## 2023-07-05 NOTE — CHART NOTE - NSCHARTNOTEFT_GEN_A_CORE
PROCEDURE:   [x ] LHC   [ ] LVG   [ ] RHC   [x ] Intervention (see below)       PHYSICIAN:  Dr. Cazares and Dr. Lancaster  FELLOW: Dr. Manzano    PROCEDURE DESCRIPTION:     Consent:    [x] Patient   [] Family Member   []  Used      Anesthesia:   [ ] General   [X] Sedation   [X] Local     Access & Closure:   [ X]  6 Fr    L    Radial Artery  -> TR Band   [ X]  6 Fr R      Femoral Artery -> Sutured in place      IV Contrast: 120 mL      Intervention:  SYNERGY XD 2.75X32 distal circumflex (AUC 7)     FINDINGS:   The coronary circulation is co-dominant. There was significant 3-vessel coronary artery disease (LAD. RCA, and circumflex). Bypass grafts were patent.     Left main: The vessel was large sized.   Distal left main: There was a 30 % stenosis. LAD: The vessel was medium sized. Proximal LAD: Angiography showed mild atherosclerosis with no flow limiting lesions. Mid LAD: There was a 100 % stenosis just after D1. Distal LAD; The artery was supplied by a patent bypass graft. 1st diagonal: The vessel was small sized. Angiography showed moderate atherosclerosis with no clinical lesions appreciated.     Circumflex: The vessel was medium sized (co-dominant). There were two major obtuse marginals: Proximal circumflex: The vessel was medium sized. There was a 80 % stenosis at the origin of OM1. Ist obtuse marginal: The vessel was medium  sized. The artery was supplied by a patent bypass graft. There was a 100 % stenosis in the proximal third of the vessel segment. 1st left posterolateral: The vessel was small sized.     RCA: The vessel was medium to large sized. Proximal RCA: Angiography showed moderate atherosclerosis with no clinical lesions appreciated. Mid RCA: There was a discrete 100 % stenosis. Dista] RCA: The artery was supplied by a patent bypass graft. Graft to the LAD: The graft was a medium sized LIMA from the aorta. Graft angiography showed no evidence of disease and no degeneration. Graft to the 1st obtuse marginal: The graft was a medium sized aphenous vein graft from  the aorta. Graft angiography showed no evidence of disease. Graft to the RPDA: The graft was a large sized saphenous vein graft from the aorta. Graft angiography showed no evidence of disease.    ESTIMATED BLOOD LOSS: < 10 mL      CONDITION:   [x] Good   [ ] Fair   [ ] Critical     SPECIMEN REMOVED: N/A     POST-OP DIAGNOSIS:    [ ] Normal Coronary Angiogram   [ ] Mild Coronary Artery Disease (< 50% stenosis)   [ X] 3- Vessel Coronary Artery Disease      PLAN OF CARE:   [ ] D/C Home Today   [ ] Return to In-patient bed   [ ] Admit for observation   [ ] Return for Staged Procedure in 4-6 weeks   [ ] CT Surgery Consult   [X] Medications: ASA,  statin  [X] IV Fluids: NS @ 50cc/h x 6 hours  [ ] EP Consult  [x] Remove D-stat   TR band    femoral sheath and Hold manual pressure if signs of hematoma or bleeding over radial     femoral access site.  [x] Smoking cessation PROCEDURE:   [x ] LHC   [ ] LVG   [ ] RHC   [x ] Intervention (see below)       PHYSICIAN:  Dr. Cazares and Dr. Lancaster  FELLOW: Dr. Manzano    PROCEDURE DESCRIPTION:     Consent:    [x] Patient   [] Family Member   []  Used      Anesthesia:   [ ] General   [X] Sedation   [X] Local     Access & Closure:   [ X]  6 Fr    L    Radial Artery  -> TR Band   [ X]  6 Fr R      Femoral Artery -> Sutured in place      IV Contrast: 120 mL      Intervention:  SYNERGY XD 2.75X32 distal circumflex (AUC 7)     FINDINGS:   The coronary circulation is co-dominant.    Left main: The vessel was large sized. Distal left main: There was a 40 % stenosis.     LAD: The vessel was medium sized.   Proximal LAD: Mild atherosclerosis with no flow limiting lesions.   Mid LAD: Competitive flow noted. Fills from patient LIMA-LAD graft   Distal LAD; The artery was supplied by a patent bypass graft.   1st diagonal: Moderate atherosclerosis.    Circumflex: Medium sized (co-dominant)  Proximal circumflex: Mild disease.   Distal Circumflex: 80% lesion. Collaterals to RCA.  Ist obtuse marginal: Supplied by a patent bypass graft    RCA: Medium to large sized.   Proximal RCA: 60% atherosclerosis at the distal end.   Mid RCA: Moderate atherosclerotic disease  Distal RCA: Supplied by collaterals from left system  RPDA: Small sized vessel     Graft to the distal LAD: Patent    Graft to the 1st obtuse marginal: Patent    Graft to the RPDA: 100% occluded graft    ESTIMATED BLOOD LOSS: < 10 mL      CONDITION:   [x] Good   [ ] Fair   [ ] Critical     SPECIMEN REMOVED: N/A     POST-OP DIAGNOSIS:    [ ] Normal Coronary Angiogram   [ ] Mild Coronary Artery Disease (< 50% stenosis)   [ X] 3- Vessel Coronary Artery Disease      PLAN OF CARE:   [X ] D/C Home Today   [X] Medications: ASA, Ticagrelor, statin, Metoprolol, continue with home meds  [X] IV Fluids: NS @ 100cc/h x 10 hours  [x] Remove D-stat   TR band    femoral sheath and Hold manual pressure if signs of hematoma or bleeding over radial     femoral access site.  [x] Smoking cessation PROCEDURE:     [x ] LHC   [ ] LVG   [ ] RHC   [x ] Intervention (see below)       PHYSICIAN:  Dr. Cazares and Dr. Lancaster  FELLOW: Dr. Manzano    PROCEDURE DESCRIPTION:     Consent:    [x] Patient   [] Family Member   []  Used      Anesthesia:   [ ] General   [X] Sedation   [X] Local     Access & Closure:   [ X]  6 Fr    L    Radial Artery  -> TR Band   [ X]  6 Fr R      Femoral Artery -> Sutured in place      IV Contrast: 120 mL      Intervention:  SYNERGY XD 2.75X32 distal circumflex (AUC 7)     FINDINGS:   The coronary circulation is co-dominant.    Left main: The vessel was large sized. Distal left main: There was a 40 % stenosis.     LAD: The vessel was medium sized.   Proximal LAD: Mild atherosclerosis with no flow limiting lesions.   Mid LAD: Severe disease. Fills from patient LIMA-LAD graft   Distal LAD; The artery was supplied by a patent bypass graft.   1st diagonal: Moderate atherosclerosis.    Circumflex: Medium sized   Proximal circumflex: Mild disease.   Distal Circumflex: 80% lesion. Collaterals to RCA.  Ist obtuse marginal: Supplied by a patent bypass graft    RCA: Medium to large sized.   Proximal RCA: 60% atherosclerosis.   Mid RCA: 100% occlusion  Distal RCA: Supplied by collaterals from left system      LIMA Graft to the distal LAD: Patent    SVG Graft to the 1st obtuse marginal: Patent    SVG Graft to the RPDA: 100% occluded graft proximally    ESTIMATED BLOOD LOSS: < 10 mL      CONDITION:   [x] Good   [ ] Fair   [ ] Critical     SPECIMEN REMOVED: N/A     POST-OP DIAGNOSIS:    [ ] Normal Coronary Angiogram   [ ] Mild Coronary Artery Disease (< 50% stenosis)   [ X] 3- Vessel Coronary Artery Disease      PLAN OF CARE:   [X ] D/C Home Today   [X] Medications: ASA, Ticagrelor, statin, Metoprolol, continue with home meds  [X] IV Fluids: NS @ 100cc/h x 10 hours  [x] Remove D-stat   TR band    femoral sheath and Hold manual pressure if signs of hematoma or bleeding over radial     femoral access site.  [x] Smoking cessation

## 2023-07-05 NOTE — H&P CARDIOLOGY - HISTORY OF PRESENT ILLNESS
Patient is a 73y Male who has a PMH of CAD s/p CABG in [facility] over 10years ago per patient (LIMA-LAD,SVG to OM1, SVG to RPDA), s/p PCI 8/10/2016 to LCx, HTN, HLD,  Sx history: CABG, gastric sleeve  Family history: none    Patient has been having symptoms of CP and OCHOA with fatigue after walking his dog and gets better with rest. Patient had + Stress test 23 and presents to the cardiology department for [facility] with possible intervention.     < from: NM Nuclear Stress Pharmacologic Multiple (23 @ 15:08) >    Impression:    1.   There is a small sized perfusion defect involving the basal inferior   and basal inferolateral walls of the left ventricle. On the resting   images, this defect is reversible suggesting ischemia.  2.   Stress EKG was nondiagnostic due to baseline ST and T-wave changes.  3.  Left ventricular ejection fraction of  > 55  %.  4.  This is a low  risk positive result.        Pre cath note:  indication:  [ ] STEMI                [ ] NSTEMI                 [ ] Acute coronary syndrome                   [ ]Unstable Angina   [ ] high risk  [ ] intermediate risk  [ ] low risk                   [x ] Stable Angina     non-invasive testing:    stress test                      Date:   23                  result: [ ] high risk  [ x] intermediate risk  [ ] low risk    Anti- Anginal medications:                    [ ] not used                       [x ] used                   [ ] not used but strong indication not to use  -amlodipine 10mg   -Metoprolol Succinate 25mg    Ejection Fraction                   [ ] <29            [ ] 30-39%   [ ] 40-49%     [x ]>50%    CHF                   [ ] active (within last 14 days on meds   [ ] Chronic (on meds but no exacerbation)    COPD                   [ ] mild (on chronic bronchodilators)  [ ] moderate (on chronic steroid therapy)      [ ] severe (indication for home O2 or PACO2 >50)    Other risk factors:                     [ ] Previous MI                     [ ] CVA/ stroke                    [ ] carotid stent/ CEA                    [ ] PVD/PAD- (arterial aneurysm, non-palpable pulses, tortuous vessel with inability to insert catheter, infra-renal dissection, renal or subclavian artery stenosis)                    [ ] diabetic                    [x ] previous CABG                    [ ] Renal Failure     Bleeding Risk: 0.7%      Left RADIAL ARTERY EVALUATION:  CONRAD TEST: [] Negative          [] Positive  BARBEAU TEST: [] Class A           [] Class B           [] Class C            [x] Class D        EF: 57%    EK23 SR     Fluids: 250cc NS bolus x 1 for pre cath     Patient did not take ASA since 7/3  -ASA 81mg x 2 PO now for pre cath

## 2023-07-06 DIAGNOSIS — I25.710 ATHEROSCLEROSIS OF AUTOLOGOUS VEIN CORONARY ARTERY BYPASS GRAFT(S) WITH UNSTABLE ANGINA PECTORIS: ICD-10-CM

## 2023-07-06 DIAGNOSIS — I25.110 ATHEROSCLEROTIC HEART DISEASE OF NATIVE CORONARY ARTERY WITH UNSTABLE ANGINA PECTORIS: ICD-10-CM

## 2023-07-06 DIAGNOSIS — I25.84 CORONARY ATHEROSCLEROSIS DUE TO CALCIFIED CORONARY LESION: ICD-10-CM

## 2023-07-07 PROBLEM — I25.10 ATHEROSCLEROTIC HEART DISEASE OF NATIVE CORONARY ARTERY WITHOUT ANGINA PECTORIS: Chronic | Status: ACTIVE | Noted: 2023-07-05

## 2023-07-07 PROBLEM — I10 ESSENTIAL (PRIMARY) HYPERTENSION: Chronic | Status: ACTIVE | Noted: 2023-07-05

## 2023-07-07 PROBLEM — E78.5 HYPERLIPIDEMIA, UNSPECIFIED: Chronic | Status: ACTIVE | Noted: 2023-07-05

## 2023-07-11 ENCOUNTER — APPOINTMENT (OUTPATIENT)
Dept: CARDIOLOGY | Facility: CLINIC | Age: 73
End: 2023-07-11
Payer: MEDICARE

## 2023-07-11 ENCOUNTER — RESULT CHARGE (OUTPATIENT)
Age: 73
End: 2023-07-11

## 2023-07-11 VITALS
SYSTOLIC BLOOD PRESSURE: 102 MMHG | BODY MASS INDEX: 32.95 KG/M2 | HEART RATE: 64 BPM | HEIGHT: 66 IN | WEIGHT: 205 LBS | DIASTOLIC BLOOD PRESSURE: 60 MMHG

## 2023-07-11 PROCEDURE — 93000 ELECTROCARDIOGRAM COMPLETE: CPT

## 2023-07-11 PROCEDURE — 99214 OFFICE O/P EST MOD 30 MIN: CPT

## 2023-07-11 RX ORDER — TICAGRELOR 90 MG/1
90 TABLET ORAL TWICE DAILY
Refills: 0 | Status: DISCONTINUED | COMMUNITY
End: 2023-07-11

## 2023-07-11 NOTE — REVIEW OF SYSTEMS
[Dyspnea on exertion] : dyspnea during exertion [Negative] : Neurological [Chest Discomfort] : no chest discomfort [Rash] : no rash [Anxiety] : no anxiety [Easy Bleeding] : no tendency for easy bleeding [Easy Bruising] : no tendency for easy bruising

## 2023-07-11 NOTE — ASSESSMENT
[FreeTextEntry1] : 74 y/o CAD,  s/p CABG and BRIDGET LCX.\par Recent BRIDGET to distal circumflex. No angina since then.\par Hyperlipidemia \par Obesity.\par Hypotension today.\par Dizziness\par SOB, likely due to Brilinta.\par \par Plan:\par Switch from Telmisartan-HCTZ to Telmisartan 80 mg daily\par Switch from Brilinta to Plavix.\par Continue the rest of this medications.\par Diet, weight loss discussed.\par F/u in 2 months.\par \par Nelson Lancaster MD\par

## 2023-07-11 NOTE — HISTORY OF PRESENT ILLNESS
[FreeTextEntry1] : 73-yo male with h/o CAD, s/p CABG, HTN, hyperlipidemia.\par \par Inferolateral ischemia in 2016. LHC showed patent grafts, 80% stenosis of proximal LCX (at the take-off of bypassed OM1), stented successfully with BRIDGET ([employer]).\par \par S/p gastric sleeve surgery. Patient has lost 20 pounds since starting Ozempic.\par \par Pt presents for a follow up visit. He is s/p LHC: petent LIMA to LAD and SVG to OM, SVG to rPDA occluded. Successful drug-eluting stent intervention of the distal circumflex (provides collaterals to RCA). \par \par He is now c/o frequent episodes of dizziness, low BP 100s/60s and shortness of breath. \par \par \par \par

## 2023-07-11 NOTE — CARDIOLOGY SUMMARY
[de-identified] : 07/11/23:\par SB 64/min, IVCD, NSST changes in the inferior leads. [de-identified] : MPI:\par Small reversible defect basal inferior and basal inferolateral walls of LV suggestive of ischemia \par  [de-identified] : 2D ECHO:\par LVEF 57%\par Basal and mid inferior wall and basal and mid inferolateral wall abnormal\par Mildly reduced RV systolic function\par Mild LAE\par Mild TR\par Ascending aorta 4.5

## 2023-08-02 ENCOUNTER — RX RENEWAL (OUTPATIENT)
Age: 73
End: 2023-08-02

## 2023-08-07 ENCOUNTER — RX RENEWAL (OUTPATIENT)
Age: 73
End: 2023-08-07

## 2023-08-28 ENCOUNTER — RX RENEWAL (OUTPATIENT)
Age: 73
End: 2023-08-28

## 2023-09-14 ENCOUNTER — APPOINTMENT (OUTPATIENT)
Dept: CARDIOLOGY | Facility: CLINIC | Age: 73
End: 2023-09-14
Payer: MEDICARE

## 2023-09-14 VITALS
SYSTOLIC BLOOD PRESSURE: 100 MMHG | HEIGHT: 66 IN | WEIGHT: 207 LBS | DIASTOLIC BLOOD PRESSURE: 64 MMHG | HEART RATE: 75 BPM | BODY MASS INDEX: 33.27 KG/M2

## 2023-09-14 DIAGNOSIS — R42 DIZZINESS AND GIDDINESS: ICD-10-CM

## 2023-09-14 PROCEDURE — 99214 OFFICE O/P EST MOD 30 MIN: CPT

## 2023-09-14 PROCEDURE — 93000 ELECTROCARDIOGRAM COMPLETE: CPT

## 2023-12-14 ENCOUNTER — RX RENEWAL (OUTPATIENT)
Age: 73
End: 2023-12-14

## 2024-01-18 ENCOUNTER — APPOINTMENT (OUTPATIENT)
Dept: CARDIOLOGY | Facility: CLINIC | Age: 74
End: 2024-01-18
Payer: MEDICARE

## 2024-01-18 VITALS
BODY MASS INDEX: 33.59 KG/M2 | DIASTOLIC BLOOD PRESSURE: 80 MMHG | WEIGHT: 209 LBS | SYSTOLIC BLOOD PRESSURE: 114 MMHG | HEIGHT: 66 IN | HEART RATE: 65 BPM

## 2024-01-18 DIAGNOSIS — E66.9 OBESITY, UNSPECIFIED: ICD-10-CM

## 2024-01-18 DIAGNOSIS — Z98.61 CORONARY ANGIOPLASTY STATUS: ICD-10-CM

## 2024-01-18 DIAGNOSIS — E78.2 MIXED HYPERLIPIDEMIA: ICD-10-CM

## 2024-01-18 DIAGNOSIS — I10 ESSENTIAL (PRIMARY) HYPERTENSION: ICD-10-CM

## 2024-01-18 DIAGNOSIS — I25.10 ATHEROSCLEROTIC HEART DISEASE OF NATIVE CORONARY ARTERY W/OUT ANGINA PECTORIS: ICD-10-CM

## 2024-01-18 PROCEDURE — 93000 ELECTROCARDIOGRAM COMPLETE: CPT

## 2024-01-18 PROCEDURE — 99214 OFFICE O/P EST MOD 30 MIN: CPT

## 2024-01-18 RX ORDER — TELMISARTAN 80 MG/1
80 TABLET ORAL DAILY
Qty: 90 | Refills: 1 | Status: DISCONTINUED | COMMUNITY
Start: 2023-07-11 | End: 2024-01-18

## 2024-01-18 RX ORDER — SEMAGLUTIDE 2.68 MG/ML
8 INJECTION, SOLUTION SUBCUTANEOUS
Refills: 0 | Status: ACTIVE | COMMUNITY

## 2024-01-18 RX ORDER — ASPIRIN 81 MG/1
81 TABLET ORAL DAILY
Refills: 0 | Status: ACTIVE | COMMUNITY

## 2024-01-18 RX ORDER — OMEPRAZOLE 40 MG/1
40 CAPSULE, DELAYED RELEASE ORAL
Qty: 90 | Refills: 3 | Status: DISCONTINUED | COMMUNITY
Start: 2022-10-27 | End: 2024-01-18

## 2024-01-18 RX ORDER — CLOPIDOGREL BISULFATE 75 MG/1
75 TABLET, FILM COATED ORAL DAILY
Qty: 90 | Refills: 1 | Status: ACTIVE | COMMUNITY
Start: 2023-07-11 | End: 1900-01-01

## 2024-01-18 RX ORDER — SEMAGLUTIDE 1.34 MG/ML
4 INJECTION, SOLUTION SUBCUTANEOUS
Qty: 3 | Refills: 0 | Status: DISCONTINUED | COMMUNITY
Start: 2022-02-15 | End: 2024-01-18

## 2024-01-18 NOTE — REASON FOR VISIT
[FreeTextEntry1] : 72 y/o CAD, s/p CABG and BRIDGET LCX. Recent BRIDGET to distal circumflex. No angina since then. Hyperlipidemia Obesity. BP stable now. Dizziness.  Plan: Continue Telmisartan-HCTZ 80/12.5 mg daily. Monitor BP at home. Continue PT. Diet, weight loss discussed. Repeat blood work. F/u in 4 months.  Nelson Lancaster MD.

## 2024-01-18 NOTE — ASSESSMENT
[FreeTextEntry1] : 74 y/o CAD, s/p CABG and BRIDGET LCX. Recent BRIDGET to distal circumflex. No angina since then. Hyperlipidemia Obesity. BP stable now.  Plan: Continue treatment. Continue DAPT for 1 year, will stop Plavix after that. Diet, weight loss discussed. Repeat blood work. F/u in 6 months.  Nelson Lancaster MD.

## 2024-01-18 NOTE — CARDIOLOGY SUMMARY
[de-identified] : 01/18/24: SR with APCs, IVCD, NSST changes in the inferior leads. [de-identified] : MPI:\par  Small reversible defect basal inferior and basal inferolateral walls of LV suggestive of ischemia \par   [de-identified] : 2D ECHO:\par  LVEF 57%\par  Basal and mid inferior wall and basal and mid inferolateral wall abnormal\par  Mildly reduced RV systolic function\par  Mild LAE\par  Mild TR\par  Ascending aorta 4.5

## 2024-01-18 NOTE — HISTORY OF PRESENT ILLNESS
[FreeTextEntry1] : 73-yo male with h/o CAD, s/p CABG, HTN, hyperlipidemia.  Inferolateral ischemia in 2016. LHC showed patent grafts, 80% stenosis of proximal LCX (at the take-off of bypassed OM1), stented successfully with BRIDGET ([employer]nergy).  S/p gastric sleeve surgery. Patient has lost 20 pounds since starting Ozempic.  He is s/p C 7/2023: patent LIMA to LAD and SVG to OM, SVG to rPDA occluded. Successful drug-eluting stent intervention of the distal circumflex (provides collaterals to RCA).   Pt presents for a follow up visit. Denies CP, SOB.  LDL 68 GFR 94.

## 2024-01-24 ENCOUNTER — RX RENEWAL (OUTPATIENT)
Age: 74
End: 2024-01-24

## 2024-01-24 RX ORDER — AMLODIPINE BESYLATE 10 MG/1
10 TABLET ORAL
Qty: 90 | Refills: 1 | Status: ACTIVE | COMMUNITY
Start: 2020-09-03 | End: 1900-01-01

## 2024-02-02 ENCOUNTER — RX RENEWAL (OUTPATIENT)
Age: 74
End: 2024-02-02

## 2024-02-02 RX ORDER — METOPROLOL SUCCINATE 25 MG/1
25 TABLET, EXTENDED RELEASE ORAL
Qty: 90 | Refills: 1 | Status: ACTIVE | COMMUNITY
Start: 2020-09-03 | End: 1900-01-01

## 2024-02-22 ENCOUNTER — RX RENEWAL (OUTPATIENT)
Age: 74
End: 2024-02-22

## 2024-05-05 ENCOUNTER — NON-APPOINTMENT (OUTPATIENT)
Age: 74
End: 2024-05-05

## 2024-06-24 ENCOUNTER — RX RENEWAL (OUTPATIENT)
Age: 74
End: 2024-06-24

## 2024-06-24 RX ORDER — TELMISARTAN AND HYDROCHLOROTHIAZIDE 80; 25 MG/1; MG/1
80-25 TABLET ORAL
Qty: 30 | Refills: 5 | Status: ACTIVE | COMMUNITY
Start: 2020-10-04 | End: 1900-01-01

## 2024-06-24 RX ORDER — ATORVASTATIN CALCIUM 80 MG/1
80 TABLET, FILM COATED ORAL
Qty: 90 | Refills: 1 | Status: ACTIVE | COMMUNITY
Start: 2020-09-03 | End: 1900-01-01

## 2024-07-15 ENCOUNTER — LABORATORY RESULT (OUTPATIENT)
Age: 74
End: 2024-07-15

## 2024-07-16 LAB
ALBUMIN SERPL ELPH-MCNC: 4.3 G/DL
ALP BLD-CCNC: 67 U/L
ALT SERPL-CCNC: 22 U/L
ANION GAP SERPL CALC-SCNC: 13 MMOL/L
AST SERPL-CCNC: 19 U/L
BILIRUB SERPL-MCNC: 0.5 MG/DL
BUN SERPL-MCNC: 26 MG/DL
CALCIUM SERPL-MCNC: 9.3 MG/DL
CHLORIDE SERPL-SCNC: 104 MMOL/L
CHOLEST SERPL-MCNC: 148 MG/DL
CO2 SERPL-SCNC: 25 MMOL/L
CREAT SERPL-MCNC: 0.9 MG/DL
EGFR: 91 ML/MIN/1.73M2
ESTIMATED AVERAGE GLUCOSE: 126 MG/DL
GLUCOSE SERPL-MCNC: 98 MG/DL
HBA1C MFR BLD HPLC: 6 %
HCT VFR BLD CALC: 40.6 %
HDLC SERPL-MCNC: 48 MG/DL
HGB BLD-MCNC: 13.4 G/DL
LDLC SERPL CALC-MCNC: 89 MG/DL
MCHC RBC-ENTMCNC: 30.9 PG
MCHC RBC-ENTMCNC: 33 G/DL
MCV RBC AUTO: 93.5 FL
NONHDLC SERPL-MCNC: 100 MG/DL
PLATELET # BLD AUTO: 213 K/UL
PMV BLD AUTO: 0 /100 WBCS
PMV BLD: 10.5 FL
POTASSIUM SERPL-SCNC: 4.2 MMOL/L
PROT SERPL-MCNC: 6.9 G/DL
PSA SERPL-MCNC: 4.09 NG/ML
RBC # BLD: 4.34 M/UL
RBC # FLD: 14.4 %
SODIUM SERPL-SCNC: 142 MMOL/L
TRIGL SERPL-MCNC: 53 MG/DL
TSH SERPL-ACNC: 3.74 UIU/ML
WBC # FLD AUTO: 6.48 K/UL

## 2024-07-18 ENCOUNTER — APPOINTMENT (OUTPATIENT)
Dept: CARDIOLOGY | Facility: CLINIC | Age: 74
End: 2024-07-18
Payer: MEDICARE

## 2024-07-18 VITALS
BODY MASS INDEX: 35.03 KG/M2 | HEIGHT: 66 IN | SYSTOLIC BLOOD PRESSURE: 148 MMHG | HEART RATE: 56 BPM | WEIGHT: 218 LBS | DIASTOLIC BLOOD PRESSURE: 72 MMHG

## 2024-07-18 VITALS — DIASTOLIC BLOOD PRESSURE: 83 MMHG | SYSTOLIC BLOOD PRESSURE: 135 MMHG

## 2024-07-18 DIAGNOSIS — E78.2 MIXED HYPERLIPIDEMIA: ICD-10-CM

## 2024-07-18 DIAGNOSIS — I25.10 ATHEROSCLEROTIC HEART DISEASE OF NATIVE CORONARY ARTERY W/OUT ANGINA PECTORIS: ICD-10-CM

## 2024-07-18 DIAGNOSIS — Z98.61 CORONARY ANGIOPLASTY STATUS: ICD-10-CM

## 2024-07-18 DIAGNOSIS — E66.9 OBESITY, UNSPECIFIED: ICD-10-CM

## 2024-07-18 DIAGNOSIS — Z95.1 PRESENCE OF AORTOCORONARY BYPASS GRAFT: ICD-10-CM

## 2024-07-18 DIAGNOSIS — I10 ESSENTIAL (PRIMARY) HYPERTENSION: ICD-10-CM

## 2024-07-18 PROCEDURE — 93000 ELECTROCARDIOGRAM COMPLETE: CPT

## 2024-07-18 PROCEDURE — 99214 OFFICE O/P EST MOD 30 MIN: CPT

## 2024-07-18 RX ORDER — ROSUVASTATIN CALCIUM 40 MG/1
40 TABLET, FILM COATED ORAL
Qty: 90 | Refills: 1 | Status: ACTIVE | COMMUNITY
Start: 2024-07-18 | End: 1900-01-01

## 2024-07-19 ENCOUNTER — RX RENEWAL (OUTPATIENT)
Age: 74
End: 2024-07-19

## 2024-07-26 ENCOUNTER — RX RENEWAL (OUTPATIENT)
Age: 74
End: 2024-07-26

## 2024-10-15 ENCOUNTER — RX RENEWAL (OUTPATIENT)
Age: 74
End: 2024-10-15

## 2025-01-09 ENCOUNTER — RX RENEWAL (OUTPATIENT)
Age: 75
End: 2025-01-09

## 2025-01-13 ENCOUNTER — RX RENEWAL (OUTPATIENT)
Age: 75
End: 2025-01-13

## 2025-01-28 ENCOUNTER — RX RENEWAL (OUTPATIENT)
Age: 75
End: 2025-01-28

## 2025-01-30 ENCOUNTER — APPOINTMENT (OUTPATIENT)
Dept: CARDIOLOGY | Facility: CLINIC | Age: 75
End: 2025-01-30

## 2025-02-24 ENCOUNTER — OUTPATIENT (OUTPATIENT)
Dept: OUTPATIENT SERVICES | Facility: HOSPITAL | Age: 75
LOS: 1 days | End: 2025-02-24

## 2025-02-24 ENCOUNTER — APPOINTMENT (OUTPATIENT)
Dept: CT IMAGING | Facility: CLINIC | Age: 75
End: 2025-02-24
Payer: MEDICARE

## 2025-02-24 DIAGNOSIS — Z95.1 PRESENCE OF AORTOCORONARY BYPASS GRAFT: Chronic | ICD-10-CM

## 2025-02-24 DIAGNOSIS — Z90.3 ACQUIRED ABSENCE OF STOMACH [PART OF]: Chronic | ICD-10-CM

## 2025-02-24 PROCEDURE — 75574 CT ANGIO HRT W/3D IMAGE: CPT | Mod: 26

## 2025-03-04 VITALS
DIASTOLIC BLOOD PRESSURE: 72 MMHG | OXYGEN SATURATION: 97 % | RESPIRATION RATE: 16 BRPM | HEART RATE: 61 BPM | SYSTOLIC BLOOD PRESSURE: 154 MMHG

## 2025-03-04 NOTE — H&P ADULT - HISTORY OF PRESENT ILLNESS
Cardiologist:   Pharmacy:   Escort:     75 yo M with PMHx of HTN., CAD s/p CABG (LIMA to LAD     CCTA (2/24/25): Calcium score not performed due to previous CABG.  LIMA to LAD: lumen partially obscured by surgical clips. Graft is patent at is origin, course, anastomosis and runoff. The bypass graft to OM1 obscured by surgical clips. There is mild narrowing along course of the graft. Anastomosis is patent. Runoff is likely patent. OM to RPDA aorto coronary bypass graft is occluded just distal to its origin, and along its entire course. Severe stenosis in proximal and dRCA. Severe stenosis in proximal and mLAD.  Cardiologist:   Pharmacy:   Escort:     75 yo M with PMHx of HTN., CAD s/p CABG (LIMA to LAD, SVG to OM1, SVG to RPDA), s/p PCI with BRIDGET to dLCx (provides collaterals to RCA).       CCTA (2/24/25): Calcium score not performed due to previous CABG.  LIMA to LAD: lumen partially obscured by surgical clips. Graft is patent at is origin, course, anastomosis and runoff. The bypass graft to OM1 obscured by surgical clips. There is mild narrowing along course of the graft. Anastomosis is patent. Runoff is likely patent. OM to RPDA aorto coronary bypass graft is occluded just distal to its origin, and along its entire course. Severe stenosis in proximal and dRCA. Severe stenosis in proximal and mLAD.  Cardiologist:   Pharmacy:   Escort:     75 yo M with PMHx of obesity s/p gastric sleeve, HTN, CAD s/p CABG (LIMA to LAD, SVG to OM1, SVG to RPDA) over 10 years ago per patient, s/p PCI with BRIDGET to dLCx in 2016        CCTA (2/24/25): Calcium score not performed due to previous CABG.  LIMA to LAD: lumen partially obscured by surgical clips. Graft is patent at is origin, course, anastomosis and runoff. The bypass graft to OM1 obscured by surgical clips. There is mild narrowing along course of the graft. Anastomosis is patent. Runoff is likely patent. OM to RPDA aorto coronary bypass graft is occluded just distal to its origin, and along its entire course. Severe stenosis in proximal and dRCA. Severe stenosis in proximal and mLAD.  Cardiologist: Dr. Rosario   Pharmacy:   Escort:     73 yo M with PMHx of obesity s/p gastric sleeve, HTN, CAD s/p CABG (LIMA to LAD, SVG to OM1, SVG to RPDA) over 10 years ago per patient, s/p PCI with BRIDGET to dLCx in 2016 who presented to outpatient cardiologist for ___ routine visit and was found to have abnormal CCTA. Patient denies CP, SOB, OCHOA, orthopnea, abdominal pain, N/V/D, dizziness, lightheadedness, BRBPR, hematochezia, fever or sick contacts.     CCTA (2/24/25): Calcium score not performed due to previous CABG. FRYE to LAD: Graft is patent at is origin, course, anastomosis and runoff. The bypass graft to OM1: mild narrowing along course of the graft. Anastomosis is patent. Runoff is likely patent. OM to RPDA aorto coronary bypass graft: occluded distal to its origin and along its entire course. Severe stenosis in proximal and dRCA and in proximal and mLAD.   TTE (12/11/24): EF 55-60%, RSVF normal, mild MV prolapse, trace to mild MR, mild TR    In light of patient's risk factors, CCS anginal class III symptoms and recent abnormal CCTA, patient presents to [facility] for left heart catheterization with possible intervention if clinically indicated.    Cardiologist: Dr. Rosario   Pharmacy: [facility]    Escort: Son    73 yo M with PMHx of obesity s/p gastric sleeve, HTN, CAD s/p CABG (LIMA to LAD, SVG to OM1, SVG to RPDA) over 10 years ago per patient, s/p PCI with BRIDGET to dLCx in 2016 who presented to outpatient cardiologist for SOB for the past several months. He reports feeling SOB after walking 1 flight of stairs. In light of pt sx and history he was sent for CCTA, which was abnormal. Patient denies CP, SOB, OCHOA, orthopnea, abdominal pain, N/V/D, dizziness, lightheadedness, BRBPR, hematochezia, fever or sick contacts.     CCTA (2/24/25): Calcium score not performed due to previous CABG. FRYE to LAD: Graft is patent at is origin, course, anastomosis and runoff. The bypass graft to OM1: mild narrowing along course of the graft. Anastomosis is patent. Runoff is likely patent. OM to RPDA aorto coronary bypass graft: occluded distal to its origin and along its entire course. Severe stenosis in proximal and dRCA and in proximal and mLAD.   TTE (12/11/24): EF 55-60%, RSVF normal, mild MV prolapse, trace to mild MR, mild TR    In light of patient's risk factors, CCS anginal class III symptoms and recent abnormal CCTA, patient presents to [facility] for left heart catheterization with possible intervention if clinically indicated.    Cardiologist: Dr. Rosario   Pharmacy: [facility]    Escort: Son    75 yo M with PMHx of obesity s/p gastric sleeve, AFIB (on eliquis LD__), HTN, CAD s/p CABG (LIMA to LAD, SVG to OM1, SVG to RPDA) over 10 years ago per patient, s/p PCI with BRIDGET to dLCx in 2016 who presented to outpatient cardiologist for SOB for the past several months. He reports feeling SOB after walking 1 flight of stairs. In light of pt sx and history he was sent for CCTA, which was abnormal. Patient denies CP, SOB, OCHOA, orthopnea, abdominal pain, N/V/D, dizziness, lightheadedness, BRBPR, hematochezia, fever or sick contacts.     CCTA (2/24/25): Calcium score not performed due to previous CABG. FRYE to LAD: Graft is patent at is origin, course, anastomosis and runoff. The bypass graft to OM1: mild narrowing along course of the graft. Anastomosis is patent. Runoff is likely patent. OM to RPDA aorto coronary bypass graft: occluded distal to its origin and along its entire course. Severe stenosis in proximal and dRCA and in proximal and mLAD.   TTE (12/11/24): EF 55-60%, RSVF normal, mild MV prolapse, trace to mild MR, mild TR    In light of patient's risk factors, CCS anginal class III symptoms and recent abnormal CCTA, patient presents to [facility] for left heart catheterization with possible intervention if clinically indicated.

## 2025-03-04 NOTE — H&P ADULT - NSHPLABSRESULTS_GEN_ALL_CORE
13.6   7.61  )-----------( 191      ( 06 Mar 2025 14:31 )             41.3               PT/INR - ( 06 Mar 2025 14:31 )   PT: 12.0 sec;   INR: 1.03          PTT - ( 06 Mar 2025 14:31 )  PTT:34.8 sec              EKG:

## 2025-03-04 NOTE — H&P ADULT - ASSESSMENT
ASA III          Mallampati III  Patient is a candidate for sedation: yes  Sedation: Moderate    Risks & benefits of procedure and alternative therapy have been explained to the patient including but not limited to: allergic reaction, bleeding w/possible need for blood transfusion, infection, renal and vascular compromise, limb damage, arrhythmia, stroke, vessel dissection/perforation, Myocardial infarction, emergent CABG. Informed consent obtained and in chart.       Patient denies bleeding, BRBPR, melena, hematuria, hematemesis.        cc Bolus IV x 1 followed by NS 75 cc/hr x 2 hrs per Hydration Protocol.

## 2025-03-06 ENCOUNTER — OUTPATIENT (OUTPATIENT)
Dept: OUTPATIENT SERVICES | Facility: HOSPITAL | Age: 75
LOS: 1 days | End: 2025-03-06
Payer: MEDICARE

## 2025-03-06 DIAGNOSIS — Z95.1 PRESENCE OF AORTOCORONARY BYPASS GRAFT: Chronic | ICD-10-CM

## 2025-03-06 DIAGNOSIS — Z90.3 ACQUIRED ABSENCE OF STOMACH [PART OF]: Chronic | ICD-10-CM

## 2025-03-06 LAB
A1C WITH ESTIMATED AVERAGE GLUCOSE RESULT: 5.6 % — SIGNIFICANT CHANGE UP (ref 4–5.6)
ALBUMIN SERPL ELPH-MCNC: 4.3 G/DL — SIGNIFICANT CHANGE UP (ref 3.3–5)
ALP SERPL-CCNC: 61 U/L — SIGNIFICANT CHANGE UP (ref 40–120)
ALT FLD-CCNC: 23 U/L — SIGNIFICANT CHANGE UP (ref 10–45)
ANION GAP SERPL CALC-SCNC: 11 MMOL/L — SIGNIFICANT CHANGE UP (ref 5–17)
APTT BLD: 34.8 SEC — SIGNIFICANT CHANGE UP (ref 24.5–35.6)
AST SERPL-CCNC: 17 U/L — SIGNIFICANT CHANGE UP (ref 10–40)
BASOPHILS # BLD AUTO: 0.05 K/UL — SIGNIFICANT CHANGE UP (ref 0–0.2)
BASOPHILS NFR BLD AUTO: 0.7 % — SIGNIFICANT CHANGE UP (ref 0–2)
BILIRUB SERPL-MCNC: 0.6 MG/DL — SIGNIFICANT CHANGE UP (ref 0.2–1.2)
BUN SERPL-MCNC: 20 MG/DL — SIGNIFICANT CHANGE UP (ref 7–23)
CALCIUM SERPL-MCNC: 9.2 MG/DL — SIGNIFICANT CHANGE UP (ref 8.4–10.5)
CHLORIDE SERPL-SCNC: 104 MMOL/L — SIGNIFICANT CHANGE UP (ref 96–108)
CHOLEST SERPL-MCNC: 116 MG/DL — SIGNIFICANT CHANGE UP
CK MB CFR SERPL CALC: 2.6 NG/ML — SIGNIFICANT CHANGE UP (ref 0–6.7)
CK SERPL-CCNC: 147 U/L — SIGNIFICANT CHANGE UP (ref 30–200)
CO2 SERPL-SCNC: 26 MMOL/L — SIGNIFICANT CHANGE UP (ref 22–31)
CREAT SERPL-MCNC: 0.8 MG/DL — SIGNIFICANT CHANGE UP (ref 0.5–1.3)
EGFR: 94 ML/MIN/1.73M2 — SIGNIFICANT CHANGE UP
EGFR: 94 ML/MIN/1.73M2 — SIGNIFICANT CHANGE UP
EOSINOPHIL # BLD AUTO: 0.03 K/UL — SIGNIFICANT CHANGE UP (ref 0–0.5)
EOSINOPHIL NFR BLD AUTO: 0.4 % — SIGNIFICANT CHANGE UP (ref 0–6)
ESTIMATED AVERAGE GLUCOSE: 114 MG/DL — SIGNIFICANT CHANGE UP (ref 68–114)
GLUCOSE SERPL-MCNC: 90 MG/DL — SIGNIFICANT CHANGE UP (ref 70–99)
HCT VFR BLD CALC: 41.3 % — SIGNIFICANT CHANGE UP (ref 39–50)
HDLC SERPL-MCNC: 50 MG/DL — SIGNIFICANT CHANGE UP
HGB BLD-MCNC: 13.6 G/DL — SIGNIFICANT CHANGE UP (ref 13–17)
IMM GRANULOCYTES NFR BLD AUTO: 0.1 % — SIGNIFICANT CHANGE UP (ref 0–0.9)
INR BLD: 1.03 — SIGNIFICANT CHANGE UP (ref 0.85–1.16)
LIPID PNL WITH DIRECT LDL SERPL: 52 MG/DL — SIGNIFICANT CHANGE UP
LYMPHOCYTES # BLD AUTO: 2.48 K/UL — SIGNIFICANT CHANGE UP (ref 1–3.3)
LYMPHOCYTES # BLD AUTO: 32.6 % — SIGNIFICANT CHANGE UP (ref 13–44)
MAGNESIUM SERPL-MCNC: 2.3 MG/DL — SIGNIFICANT CHANGE UP (ref 1.6–2.6)
MCHC RBC-ENTMCNC: 29.2 PG — SIGNIFICANT CHANGE UP (ref 27–34)
MCHC RBC-ENTMCNC: 32.9 G/DL — SIGNIFICANT CHANGE UP (ref 32–36)
MCV RBC AUTO: 88.8 FL — SIGNIFICANT CHANGE UP (ref 80–100)
MONOCYTES # BLD AUTO: 0.75 K/UL — SIGNIFICANT CHANGE UP (ref 0–0.9)
MONOCYTES NFR BLD AUTO: 9.9 % — SIGNIFICANT CHANGE UP (ref 2–14)
NEUTROPHILS # BLD AUTO: 4.29 K/UL — SIGNIFICANT CHANGE UP (ref 1.8–7.4)
NEUTROPHILS NFR BLD AUTO: 56.3 % — SIGNIFICANT CHANGE UP (ref 43–77)
NON HDL CHOLESTEROL: 66 MG/DL — SIGNIFICANT CHANGE UP
NRBC BLD AUTO-RTO: 0 /100 WBCS — SIGNIFICANT CHANGE UP (ref 0–0)
PLATELET # BLD AUTO: 191 K/UL — SIGNIFICANT CHANGE UP (ref 150–400)
POTASSIUM SERPL-MCNC: 3.9 MMOL/L — SIGNIFICANT CHANGE UP (ref 3.5–5.3)
POTASSIUM SERPL-SCNC: 3.9 MMOL/L — SIGNIFICANT CHANGE UP (ref 3.5–5.3)
PROT SERPL-MCNC: 7.6 G/DL — SIGNIFICANT CHANGE UP (ref 6–8.3)
PROTHROM AB SERPL-ACNC: 12 SEC — SIGNIFICANT CHANGE UP (ref 9.9–13.4)
RBC # BLD: 4.65 M/UL — SIGNIFICANT CHANGE UP (ref 4.2–5.8)
RBC # FLD: 15.1 % — HIGH (ref 10.3–14.5)
SODIUM SERPL-SCNC: 141 MMOL/L — SIGNIFICANT CHANGE UP (ref 135–145)
TRIGL SERPL-MCNC: 69 MG/DL — SIGNIFICANT CHANGE UP
WBC # BLD: 7.61 K/UL — SIGNIFICANT CHANGE UP (ref 3.8–10.5)
WBC # FLD AUTO: 7.61 K/UL — SIGNIFICANT CHANGE UP (ref 3.8–10.5)

## 2025-03-06 PROCEDURE — 85730 THROMBOPLASTIN TIME PARTIAL: CPT

## 2025-03-06 PROCEDURE — 80061 LIPID PANEL: CPT

## 2025-03-06 PROCEDURE — 93010 ELECTROCARDIOGRAM REPORT: CPT

## 2025-03-06 PROCEDURE — 82550 ASSAY OF CK (CPK): CPT

## 2025-03-06 PROCEDURE — 85610 PROTHROMBIN TIME: CPT

## 2025-03-06 PROCEDURE — 80053 COMPREHEN METABOLIC PANEL: CPT

## 2025-03-06 PROCEDURE — 82553 CREATINE MB FRACTION: CPT

## 2025-03-06 PROCEDURE — 83036 HEMOGLOBIN GLYCOSYLATED A1C: CPT

## 2025-03-06 PROCEDURE — 93005 ELECTROCARDIOGRAM TRACING: CPT

## 2025-03-06 PROCEDURE — 85025 COMPLETE CBC W/AUTO DIFF WBC: CPT

## 2025-03-06 PROCEDURE — 83735 ASSAY OF MAGNESIUM: CPT

## 2025-03-06 RX ORDER — CLOPIDOGREL BISULFATE 75 MG/1
1 TABLET, FILM COATED ORAL
Refills: 0 | DISCHARGE

## 2025-03-06 RX ORDER — ASPIRIN 325 MG
81 TABLET ORAL ONCE
Refills: 0 | Status: ACTIVE | OUTPATIENT
Start: 2025-03-06

## 2025-03-06 RX ORDER — CLOPIDOGREL BISULFATE 75 MG/1
75 TABLET, FILM COATED ORAL DAILY
Refills: 0 | Status: ACTIVE | OUTPATIENT
Start: 2025-03-06 | End: 2026-02-02

## 2025-03-06 RX ORDER — ISOSORBDIE DINITRATE 30 MG/1
10 TABLET ORAL
Refills: 0 | DISCHARGE

## 2025-03-13 ENCOUNTER — OUTPATIENT (OUTPATIENT)
Dept: OUTPATIENT SERVICES | Facility: HOSPITAL | Age: 75
LOS: 1 days | Discharge: HOME | End: 2025-03-13
Payer: MEDICARE

## 2025-03-13 VITALS
HEART RATE: 74 BPM | DIASTOLIC BLOOD PRESSURE: 83 MMHG | WEIGHT: 225.09 LBS | SYSTOLIC BLOOD PRESSURE: 148 MMHG | OXYGEN SATURATION: 94 % | HEIGHT: 66 IN | TEMPERATURE: 97 F | RESPIRATION RATE: 15 BRPM

## 2025-03-13 DIAGNOSIS — Z90.3 ACQUIRED ABSENCE OF STOMACH [PART OF]: Chronic | ICD-10-CM

## 2025-03-13 DIAGNOSIS — Z95.1 PRESENCE OF AORTOCORONARY BYPASS GRAFT: Chronic | ICD-10-CM

## 2025-03-13 LAB
A1C WITH ESTIMATED AVERAGE GLUCOSE RESULT: 5.7 % — HIGH (ref 4–5.6)
ALBUMIN SERPL ELPH-MCNC: 4.1 G/DL — SIGNIFICANT CHANGE UP (ref 3.3–5)
ALP SERPL-CCNC: 68 U/L — SIGNIFICANT CHANGE UP (ref 40–120)
ALT FLD-CCNC: 24 U/L — SIGNIFICANT CHANGE UP (ref 10–45)
ANION GAP SERPL CALC-SCNC: 13 MMOL/L — SIGNIFICANT CHANGE UP (ref 5–17)
APTT BLD: 31.9 SEC — SIGNIFICANT CHANGE UP (ref 24.5–35.6)
AST SERPL-CCNC: 20 U/L — SIGNIFICANT CHANGE UP (ref 10–40)
BASOPHILS # BLD AUTO: 0.05 K/UL — SIGNIFICANT CHANGE UP (ref 0–0.2)
BASOPHILS NFR BLD AUTO: 0.6 % — SIGNIFICANT CHANGE UP (ref 0–2)
BILIRUB SERPL-MCNC: 0.5 MG/DL — SIGNIFICANT CHANGE UP (ref 0.2–1.2)
BUN SERPL-MCNC: 23 MG/DL — SIGNIFICANT CHANGE UP (ref 7–23)
CALCIUM SERPL-MCNC: 9.2 MG/DL — SIGNIFICANT CHANGE UP (ref 8.4–10.5)
CHLORIDE SERPL-SCNC: 101 MMOL/L — SIGNIFICANT CHANGE UP (ref 96–108)
CHOLEST SERPL-MCNC: 116 MG/DL — SIGNIFICANT CHANGE UP
CK MB CFR SERPL CALC: 2.2 NG/ML — SIGNIFICANT CHANGE UP (ref 0–6.7)
CK SERPL-CCNC: 118 U/L — SIGNIFICANT CHANGE UP (ref 30–200)
CO2 SERPL-SCNC: 26 MMOL/L — SIGNIFICANT CHANGE UP (ref 22–31)
CREAT SERPL-MCNC: 0.72 MG/DL — SIGNIFICANT CHANGE UP (ref 0.5–1.3)
EGFR: 97 ML/MIN/1.73M2 — SIGNIFICANT CHANGE UP
EGFR: 97 ML/MIN/1.73M2 — SIGNIFICANT CHANGE UP
EOSINOPHIL # BLD AUTO: 0.19 K/UL — SIGNIFICANT CHANGE UP (ref 0–0.5)
EOSINOPHIL NFR BLD AUTO: 2.3 % — SIGNIFICANT CHANGE UP (ref 0–6)
ESTIMATED AVERAGE GLUCOSE: 117 MG/DL — HIGH (ref 68–114)
GLUCOSE SERPL-MCNC: 98 MG/DL — SIGNIFICANT CHANGE UP (ref 70–99)
HCT VFR BLD CALC: 41.7 % — SIGNIFICANT CHANGE UP (ref 39–50)
HDLC SERPL-MCNC: 50 MG/DL — SIGNIFICANT CHANGE UP
HGB BLD-MCNC: 14 G/DL — SIGNIFICANT CHANGE UP (ref 13–17)
IMM GRANULOCYTES NFR BLD AUTO: 0.2 % — SIGNIFICANT CHANGE UP (ref 0–0.9)
INR BLD: 1.03 — SIGNIFICANT CHANGE UP (ref 0.85–1.16)
LIPID PNL WITH DIRECT LDL SERPL: 51 MG/DL — SIGNIFICANT CHANGE UP
LYMPHOCYTES # BLD AUTO: 2.36 K/UL — SIGNIFICANT CHANGE UP (ref 1–3.3)
LYMPHOCYTES # BLD AUTO: 28.8 % — SIGNIFICANT CHANGE UP (ref 13–44)
MAGNESIUM SERPL-MCNC: 2.1 MG/DL — SIGNIFICANT CHANGE UP (ref 1.6–2.6)
MCHC RBC-ENTMCNC: 29.4 PG — SIGNIFICANT CHANGE UP (ref 27–34)
MCHC RBC-ENTMCNC: 33.6 G/DL — SIGNIFICANT CHANGE UP (ref 32–36)
MCV RBC AUTO: 87.4 FL — SIGNIFICANT CHANGE UP (ref 80–100)
MONOCYTES # BLD AUTO: 0.89 K/UL — SIGNIFICANT CHANGE UP (ref 0–0.9)
MONOCYTES NFR BLD AUTO: 10.9 % — SIGNIFICANT CHANGE UP (ref 2–14)
NEUTROPHILS # BLD AUTO: 4.69 K/UL — SIGNIFICANT CHANGE UP (ref 1.8–7.4)
NEUTROPHILS NFR BLD AUTO: 57.2 % — SIGNIFICANT CHANGE UP (ref 43–77)
NON HDL CHOLESTEROL: 66 MG/DL — SIGNIFICANT CHANGE UP
NRBC BLD AUTO-RTO: 0 /100 WBCS — SIGNIFICANT CHANGE UP (ref 0–0)
PLATELET # BLD AUTO: 201 K/UL — SIGNIFICANT CHANGE UP (ref 150–400)
POTASSIUM SERPL-MCNC: 3.7 MMOL/L — SIGNIFICANT CHANGE UP (ref 3.5–5.3)
POTASSIUM SERPL-SCNC: 3.7 MMOL/L — SIGNIFICANT CHANGE UP (ref 3.5–5.3)
PROT SERPL-MCNC: 7.6 G/DL — SIGNIFICANT CHANGE UP (ref 6–8.3)
PROTHROM AB SERPL-ACNC: 11.8 SEC — SIGNIFICANT CHANGE UP (ref 9.9–13.4)
RBC # BLD: 4.77 M/UL — SIGNIFICANT CHANGE UP (ref 4.2–5.8)
RBC # FLD: 14.9 % — HIGH (ref 10.3–14.5)
SODIUM SERPL-SCNC: 140 MMOL/L — SIGNIFICANT CHANGE UP (ref 135–145)
TRIGL SERPL-MCNC: 76 MG/DL — SIGNIFICANT CHANGE UP
WBC # BLD: 8.2 K/UL — SIGNIFICANT CHANGE UP (ref 3.8–10.5)
WBC # FLD AUTO: 8.2 K/UL — SIGNIFICANT CHANGE UP (ref 3.8–10.5)

## 2025-03-13 PROCEDURE — 82550 ASSAY OF CK (CPK): CPT

## 2025-03-13 PROCEDURE — C1887: CPT

## 2025-03-13 PROCEDURE — 80053 COMPREHEN METABOLIC PANEL: CPT

## 2025-03-13 PROCEDURE — 93455 CORONARY ART/GRFT ANGIO S&I: CPT | Mod: 26

## 2025-03-13 PROCEDURE — C1760: CPT

## 2025-03-13 PROCEDURE — 93005 ELECTROCARDIOGRAM TRACING: CPT

## 2025-03-13 PROCEDURE — 82553 CREATINE MB FRACTION: CPT

## 2025-03-13 PROCEDURE — 83036 HEMOGLOBIN GLYCOSYLATED A1C: CPT

## 2025-03-13 PROCEDURE — 93010 ELECTROCARDIOGRAM REPORT: CPT

## 2025-03-13 PROCEDURE — 83735 ASSAY OF MAGNESIUM: CPT

## 2025-03-13 PROCEDURE — 85610 PROTHROMBIN TIME: CPT

## 2025-03-13 PROCEDURE — 93455 CORONARY ART/GRFT ANGIO S&I: CPT

## 2025-03-13 PROCEDURE — 99152 MOD SED SAME PHYS/QHP 5/>YRS: CPT

## 2025-03-13 PROCEDURE — C1894: CPT

## 2025-03-13 PROCEDURE — C1769: CPT

## 2025-03-13 PROCEDURE — 85730 THROMBOPLASTIN TIME PARTIAL: CPT

## 2025-03-13 PROCEDURE — 85025 COMPLETE CBC W/AUTO DIFF WBC: CPT

## 2025-03-13 PROCEDURE — 80061 LIPID PANEL: CPT

## 2025-03-13 RX ORDER — ASPIRIN 325 MG
81 TABLET ORAL ONCE
Refills: 0 | Status: COMPLETED | OUTPATIENT
Start: 2025-03-13 | End: 2025-03-13

## 2025-03-13 RX ORDER — APIXABAN 2.5 MG/1
1 TABLET, FILM COATED ORAL
Qty: 60 | Refills: 2
Start: 2025-03-13 | End: 2025-06-10

## 2025-03-13 RX ORDER — APIXABAN 2.5 MG/1
1 TABLET, FILM COATED ORAL
Refills: 0 | DISCHARGE

## 2025-03-13 RX ORDER — ISOSORBDIE DINITRATE 30 MG/1
1 TABLET ORAL
Refills: 0 | DISCHARGE

## 2025-03-13 RX ORDER — CLOPIDOGREL BISULFATE 75 MG/1
1 TABLET, FILM COATED ORAL
Refills: 0 | DISCHARGE

## 2025-03-13 RX ORDER — ROSUVASTATIN CALCIUM 20 MG/1
1 TABLET, FILM COATED ORAL
Refills: 0 | DISCHARGE

## 2025-03-13 RX ORDER — CLOPIDOGREL BISULFATE 75 MG/1
600 TABLET, FILM COATED ORAL ONCE
Refills: 0 | Status: COMPLETED | OUTPATIENT
Start: 2025-03-13 | End: 2025-03-13

## 2025-03-13 RX ORDER — GABAPENTIN 400 MG/1
1 CAPSULE ORAL
Refills: 0 | DISCHARGE

## 2025-03-13 RX ADMIN — Medication 81 MILLIGRAM(S): at 12:08

## 2025-03-13 RX ADMIN — Medication 500 MILLILITER(S): at 12:11

## 2025-03-13 RX ADMIN — CLOPIDOGREL BISULFATE 600 MILLIGRAM(S): 75 TABLET, FILM COATED ORAL at 12:09

## 2025-03-13 RX ADMIN — Medication 75 MILLILITER(S): at 12:12

## 2025-03-13 RX ADMIN — Medication 200 MILLILITER(S): at 15:56

## 2025-03-13 RX ADMIN — Medication 40 MILLIEQUIVALENT(S): at 12:53

## 2025-03-13 NOTE — H&P ADULT - CARDIOVASCULAR
S1 S2 present/no gallops/no rub/no pedal edema/irregular rate and rhythm/Irregularly irregular rhythm/murmur/vascular

## 2025-03-13 NOTE — PROGRESS NOTE ADULT - SUBJECTIVE AND OBJECTIVE BOX
Interventional Cardiology PA Post PCI SDA Discharge Note      Patient without complaints. Ambulated and voided without difficulties    Afebrile, VSS    PRESCRIPTIONS/HOME MEDICATIONS:  amLODIPine 10 mg oral tablet: 1 tab(s) orally once a day  Aspirin Enteric Coated 81 mg oral delayed release tablet: 1 tab(s) orally once a day MDD: 1  Eliquis 5 mg oral tablet: 1 tab(s) orally 2 times a day  gabapentin 100 mg oral capsule: 1 cap(s) orally once a day as needed for  moderate pain  isosorbide dinitrate 10 mg oral tablet: 1 tab(s) orally 2 times a day  metoprolol succinate 25 mg oral capsule, extended release: 1 orally once a day  Plavix 75 mg oral tablet: 1 tab(s) orally once a day *Retrieved from Pharmacy 3/12/25 but not yet started*  rosuvastatin 40 mg oral capsule: 1 cap(s) orally once a day  telmisartan-hydrochlorothiazide 80 mg-25 mg oral tablet: 1 tab(s) orally once a day      Ext:    	Right femoral artery access site stable without bleeding or hematoma. Dressing is clean/dry/intact.       Pulses:    intact RAD/DP/PT to baseline     A/P: 74M, former smoker, with PMHx of obesity s/p gastric sleeve, AFib not yet on A/C, HTN, CAD s/p CABG (LIMA to LAD, SVG to OM1, SVG to RPDA) >10 yrs ago s/p subsequent PCIs most recently ~2023,  who presented to outpatient cardiologist for SOB/OCHOA for the past several months with 2 flights of stairs, found to have abnormal CCTA. Pt did not know about Atrial Fibrillation as a known rhythm for him (seen in office EKG from 12/2024), denies any anticoagulation use (verified with pharmacy), and also denies any hx of GIB or anemia; as d/w Dr. Rosario, plan to start Eliquis post-[facility].  In light of patient's risk factors, CCS anginal class III symptoms and recent abnormal CCTA, patient presents to [facility] for left heart catheterization with possible intervention if clinically indicated. Patient is now s/p diagnostic LHC: LM mild luminal irregularities, mLAD 100% complete total occlusion, LCX mild luminal irregularities, % complete total occulusion, mRCA 100% complete total occlusion, patent LIMA-LAD, patent SVG-OM, no LVEDP. Diagnosis of new afib. To start Eliquis 5mg PO BID TOMORROW 3/13/25 ONLY if RFA access site is stable. Explained to patient signs/symptoms to look out for including swelling, bleeding, hardening and pain at the site. If he experiences these sx tomrorow, instructed to HOLD on starting Eliquis and to call Dr. Rosario.      1.	  Follow-up with PMD/Cardiologist Dr. Rosario in 72 hours.  2.       Post procedure labs/EKG reviewed and stable.    3.       Pt given instructions on importance of taking antiplatelet medication(s).    4. 	  Stable for discharge as per attending Dr. Rodriguez after bed rest, pt voids, groin/wrist stable and 30 minutes of ambulation.  5.       Prescriptions for Aspirin/Plavix/Brilinta/Effient e-prescribed and submitted to patient's pharmacy-- No as this was a diagnostic LHC and patient has enough Plavix at home.   6.       Patient will continue Rosuvastatin 40mg.   7.       Patient will continue All Other Home Medications.  To start Eliquis 5mg PO BID as instructed above.   8.       Is patient a Current Smoker: No  9.	Cardiac rehab not indicated at this time as diagnostic LHC.       10. CVD ( (STEMI/NSTEMI/ACS/UA &/OR Post PCI this admission): GLP-1 receptor agonist, SGLT2 inhibitor meds discussed w/ patient and encouraged to discuss further with PMD or endo at next visit: Yes  11. Discharge forms signed and copies in chart       Interventional Cardiology PA Post PCI SDA Discharge Note      Patient without complaints. Ambulated and voided without difficulties    Afebrile, VSS    PRESCRIPTIONS/HOME MEDICATIONS:  amLODIPine 10 mg oral tablet: 1 tab(s) orally once a day  Aspirin Enteric Coated 81 mg oral delayed release tablet: 1 tab(s) orally once a day MDD: 1  Eliquis 5 mg oral tablet: 1 tab(s) orally 2 times a day  gabapentin 100 mg oral capsule: 1 cap(s) orally once a day as needed for  moderate pain  isosorbide dinitrate 10 mg oral tablet: 1 tab(s) orally 2 times a day  metoprolol succinate 25 mg oral capsule, extended release: 1 orally once a day  Plavix 75 mg oral tablet: 1 tab(s) orally once a day *Retrieved from Pharmacy 3/12/25 but not yet started*  rosuvastatin 40 mg oral capsule: 1 cap(s) orally once a day  telmisartan-hydrochlorothiazide 80 mg-25 mg oral tablet: 1 tab(s) orally once a day      Ext:    	Right femoral artery access site stable without bleeding or hematoma. Dressing is clean/dry/intact.       Pulses:    intact RAD/DP/PT to baseline     A/P: 74M, former smoker, with PMHx of obesity s/p gastric sleeve, AFib not yet on A/C, HTN, CAD s/p CABG (LIMA to LAD, SVG to OM1, SVG to RPDA) >10 yrs ago s/p subsequent PCIs most recently ~2023,  who presented to outpatient cardiologist for SOB/OCHOA for the past several months with 2 flights of stairs, found to have abnormal CCTA. Pt did not know about Atrial Fibrillation as a known rhythm for him (seen in office EKG from 12/2024), denies any anticoagulation use (verified with pharmacy), and also denies any hx of GIB or anemia; as d/w Dr. Rosario, plan to start Eliquis post-[facility].  In light of patient's risk factors, CCS anginal class III symptoms and recent abnormal CCTA, patient presents to [facility] for left heart catheterization with possible intervention if clinically indicated. Patient is now s/p diagnostic LHC: LM mild luminal irregularities, mLAD 100% complete total occlusion, LCX mild luminal irregularities, % complete total occulusion, mRCA 100% complete total occlusion, patent LIMA-LAD, patent SVG-OM, no LVEDP. Diagnosis of new afib. To STOP Aspiring and START Eliquis 5mg PO BID TOMORROW 3/13/25 ONLY if RFA access site is stable. Explained to patient signs/symptoms of unstable access site including but not limited to swelling, bleeding, hardening and pain at the site. If he experiences these sx tomrorow, instructed to HOLD on starting Eliquis and to call Dr. Rosario.      1.	  Follow-up with PMD/Cardiologist Dr. Rosario in 72 hours.  2.       Post procedure labs/EKG reviewed and stable.    3.       Pt given instructions on importance of taking antiplatelet medication(s).    4. 	  Stable for discharge as per attending Dr. Rodriguez after bed rest, pt voids, groin/wrist stable and 30 minutes of ambulation.  5.       Prescriptions for Aspirin/Plavix/Brilinta/Effient e-prescribed and submitted to patient's pharmacy-- No as this was a diagnostic LHC and patient has enough Plavix at home.   6.       Patient will continue Rosuvastatin 40mg.   7.       Patient will continue All Other Home Medications.  To start Eliquis 5mg PO BID as instructed above.   8.       Is patient a Current Smoker: No  9.	Cardiac rehab not indicated at this time as diagnostic LHC.       10. CVD ( (STEMI/NSTEMI/ACS/UA &/OR Post PCI this admission): GLP-1 receptor agonist, SGLT2 inhibitor meds discussed w/ patient and encouraged to discuss further with PMD or endo at next visit: Yes  11. Discharge forms signed and copies in chart

## 2025-03-13 NOTE — H&P ADULT - NSHPLABSRESULTS_GEN_ALL_CORE
14.0   8.20  )-----------( 201      ( 13 Mar 2025 09:54 )             41.7       03-13    140  |  101  |  23  ----------------------------<  98  3.7   |  26  |  0.72    Ca    9.2      13 Mar 2025 09:54  Mg     2.1     03-13    TPro  7.6  /  Alb  4.1  /  TBili  0.5  /  DBili  x   /  AST  20  /  ALT  24  /  AlkPhos  68  03-13      PT/INR - ( 13 Mar 2025 09:54 )   PT: 11.8 sec;   INR: 1.03          PTT - ( 13 Mar 2025 09:54 )  PTT:31.9 sec    CARDIAC MARKERS ( 13 Mar 2025 09:54 )  x     / x     / x     / x     / 2.2 ng/mL        Urinalysis Basic - ( 13 Mar 2025 09:54 )    Color: x / Appearance: x / SG: x / pH: x  Gluc: 98 mg/dL / Ketone: x  / Bili: x / Urobili: x   Blood: x / Protein: x / Nitrite: x   Leuk Esterase: x / RBC: x / WBC x   Sq Epi: x / Non Sq Epi: x / Bacteria: x

## 2025-03-13 NOTE — H&P ADULT - NSICDXPASTSURGICALHX_GEN_ALL_CORE_FT
PAST SURGICAL HISTORY:  H/O gastric sleeve     History of cholecystectomy     S/P CABG (coronary artery bypass graft)

## 2025-03-13 NOTE — H&P ADULT - HISTORY OF PRESENT ILLNESS
Cardiologist: Dr. Rosario   Pharmacy: [facility]  Escort:     73 yo M with PMHx of obesity s/p gastric sleeve, AFIB (on eliquis last dose__), HTN, CAD s/p CABG (LIMA to LAD, SVG to OM1, SVG to RPDA) over 10 years ago per patient, s/p PCI with BRIDGET to dLCx in 2016 who presented to outpatient cardiologist for SOB for the past several months. He reports feeling SOB after walking 1 flight of stairs. In light of pt sx and history he was sent for CCTA, which was abnormal. Patient denies ___ CP, SOB, OCHOA, orthopnea, abdominal pain, N/V/D, dizziness, lightheadedness, BRBPR, hematochezia, fever or sick contacts.     CCTA (2/24/25): Calcium score not performed due to previous CABG. FRYE to LAD: Graft is patent at is origin, course, anastomosis and runoff. The bypass graft to OM1: mild narrowing along course of the graft. Anastomosis is patent. Runoff is likely patent. OM to RPDA aorto coronary bypass graft: occluded distal to its origin and along its entire course. Severe stenosis in proximal and dRCA and in proximal and mLAD.   TTE (12/11/24): EF 55-60%, RSVF normal, mild MV prolapse, trace to mild MR, mild TR    In light of patient's risk factors, CCS anginal class III symptoms and recent abnormal CCTA, patient presents to [facility] for left heart catheterization with possible intervention if clinically indicated.  Cardiologist: Dr. Rosario   Pharmacy: [facility] [address] [facility]  Escort: not here but plans to call daughter or son.     74M, former smoker, with PMHx of obesity s/p gastric sleeve, AFib not yet on A/C, HTN, CAD s/p CABG (LIMA to LAD, SVG to OM1, SVG to RPDA) >10 yrs ago s/p subsequent PCIs most recently ~2023,  who presented to outpatient cardiologist for SOB/OCHOA for the past several months with 2 flights of stairs, found to have abnormal CCTA. Pt did not know about Atrial Fibrillation as a known rhythm for him (seen in office EKG from 12/2024), denies any anticoagulation use (verified with pharmacy), and also denies any hx of GIB or anemia; as d/w Dr. Rosario, plan to start Eliquis post-[facility].  On ROS, pt admits to fatigue, intermittent leg swelling with salty foods, and occasional infrequent cough.  Pt denies chest pain,  orthopnea/PND, dizziness, LOC, fall, syncope, N/V/D, fever/chills, hematuria, BRBPR, melena/hematochezia.    Cardiac Testing:   --CCTA (2/24/25): Calcium score not performed due to previous CABG. FRYE to LAD: Graft is patent at is origin, course, anastomosis and runoff. The bypass graft to OM1: mild narrowing along course of the graft. Anastomosis is patent. Runoff is likely patent. OM to RPDA aorto coronary bypass graft: occluded distal to its origin and along its entire course. Severe stenosis in proximal and dRCA and in proximal and mLAD.   --TTE (12/11/24): EF 55-60%, RSVF normal, mild MV prolapse, trace to mild MR, mild TR    In light of patient's risk factors, CCS anginal class III symptoms and recent abnormal CCTA, patient presents to [facility] for left heart catheterization with possible intervention if clinically indicated.

## 2025-03-13 NOTE — H&P ADULT - ASSESSMENT
74M, former smoker, with PMHx of obesity s/p gastric sleeve, AFib not yet on A/C, HTN, CAD s/p CABG (LIMA to LAD, SVG to OM1, SVG to RPDA) >10 yrs ago s/p subsequent PCIs most recently ~2023,  who presented to outpatient cardiologist for SOB/OCHOA for the past several months with 2 flights of stairs, found to have abnormal CCTA. Pt did not know about Atrial Fibrillation as a known rhythm for him (seen in office EKG from 12/2024), denies any anticoagulation use (verified with pharmacy), and also denies any hx of GIB or anemia; as d/w Dr. Rosario, plan to start Eliquis post-[facility].  In light of patient's risk factors, CCS anginal class III symptoms and recent abnormal CCTA, patient presents to [facility] for left heart catheterization with possible intervention if clinically indicated.     EKG: 	AFib 70bpm, TWI to III, AVF; Interventricular conduction delay		  ASA 	III  Mallampati class: IV   Anginal Class: III    -No Known Allergies    -H/H = 14.0/41.7  . Pt denies BRBPR, hematuria, hematochezia, melena. Pt endorses compliance w/ home Aspirin 81mg qd, stating last dose was yesterday. Pt loaded w/ ASA 81mg x1 and Plavix 600mg x1  -BUN/Cr = 23/0.72  . EF 55-60%. Euvolemic on exam. IV NS @ 250cc bolus followed by 75cc/hr x 2hrs started pre procedure    Sedation Plan:   Moderate  Patient Is Suitable Candidate For Sedation?     Yes    Risks & benefits of procedure and alternative therapy have been explained to the patient including but not limited to: allergic reaction, bleeding with possible need for blood transfusion, infection, renal and vascular compromise, limb damage, arrhythmia, stroke, vessel dissection/perforation, myocardial infarction, and emergent CABG. Informed consent obtained at bedside and included in chart.

## 2025-07-04 ENCOUNTER — RX RENEWAL (OUTPATIENT)
Age: 75
End: 2025-07-04

## 2025-07-07 ENCOUNTER — RX RENEWAL (OUTPATIENT)
Age: 75
End: 2025-07-07

## 2025-07-14 ENCOUNTER — RX RENEWAL (OUTPATIENT)
Age: 75
End: 2025-07-14